# Patient Record
Sex: MALE | Race: WHITE | NOT HISPANIC OR LATINO | Employment: OTHER | URBAN - METROPOLITAN AREA
[De-identification: names, ages, dates, MRNs, and addresses within clinical notes are randomized per-mention and may not be internally consistent; named-entity substitution may affect disease eponyms.]

---

## 2018-09-17 ENCOUNTER — TELEPHONE (OUTPATIENT)
Dept: INTERNAL MEDICINE | Facility: CLINIC | Age: 48
End: 2018-09-17

## 2018-10-01 ENCOUNTER — OFFICE VISIT (OUTPATIENT)
Dept: INTERNAL MEDICINE | Facility: CLINIC | Age: 48
End: 2018-10-01
Attending: INTERNAL MEDICINE
Payer: COMMERCIAL

## 2018-10-01 VITALS
DIASTOLIC BLOOD PRESSURE: 86 MMHG | HEIGHT: 74 IN | BODY MASS INDEX: 25.21 KG/M2 | HEART RATE: 74 BPM | OXYGEN SATURATION: 95 % | WEIGHT: 196.44 LBS | SYSTOLIC BLOOD PRESSURE: 125 MMHG

## 2018-10-01 DIAGNOSIS — E78.49 OTHER HYPERLIPIDEMIA: ICD-10-CM

## 2018-10-01 DIAGNOSIS — Z00.00 ROUTINE PHYSICAL EXAMINATION: Primary | ICD-10-CM

## 2018-10-01 PROCEDURE — 99999 PR PBB SHADOW E&M-EST. PATIENT-LVL IV: CPT | Mod: PBBFAC,,, | Performed by: INTERNAL MEDICINE

## 2018-10-01 PROCEDURE — 99386 PREV VISIT NEW AGE 40-64: CPT | Mod: S$GLB,,, | Performed by: INTERNAL MEDICINE

## 2018-10-01 NOTE — PROGRESS NOTES
Subjective:       Patient ID: Darrel Puri is a 48 y.o. male.    Chief Complaint: Annual Exam    HPI:  Patient comes for annual exam.  He is not for 4 years.  He says he has been healthy.  He has gained a little weight, about 1-2 lb per year over the last 4-5 years but he still remains fairly thin.  His blood pressure was borderline but I retested it and it was slightly lower.  We spent quite a bit of time talking about cholesterol, salt and caffeine and monitoring blood pressure.  We reviewed and updated family history.  He denies any other active or acute complaints.       Review of Systems   Constitutional: Negative for activity change, chills, fatigue, fever and unexpected weight change.   HENT: Negative for ear pain, hearing loss, rhinorrhea, sore throat and trouble swallowing.    Eyes: Negative for pain, discharge and visual disturbance.   Respiratory: Negative for cough, chest tightness, shortness of breath and wheezing.    Cardiovascular: Negative for chest pain, palpitations and leg swelling.   Gastrointestinal: Negative for abdominal pain, blood in stool, constipation, diarrhea, nausea and vomiting.   Endocrine: Negative for polydipsia and polyuria.   Genitourinary: Negative for difficulty urinating, frequency, hematuria and urgency.   Musculoskeletal: Negative for arthralgias, back pain, joint swelling, myalgias, neck pain and neck stiffness.   Skin: Negative for rash and wound.   Neurological: Negative for dizziness, weakness, numbness and headaches.   Hematological: Negative for adenopathy.   Psychiatric/Behavioral: Negative for confusion and dysphoric mood. The patient is not nervous/anxious.        Objective:      Physical Exam   Constitutional: He is oriented to person, place, and time. He appears well-developed and well-nourished. No distress.   HENT:   Head: Normocephalic and atraumatic.   Right Ear: External ear normal.   Left Ear: External ear normal.   Mouth/Throat: Oropharynx is clear and moist.  No oropharyngeal exudate.   TM's clear, pharynx clear   Eyes: Conjunctivae and EOM are normal. Pupils are equal, round, and reactive to light. No scleral icterus.   Neck: Normal range of motion. Neck supple. No thyromegaly present.   No supraclavicular nodes palpated   Cardiovascular: Normal rate, regular rhythm and normal heart sounds.   No murmur heard.  Pulmonary/Chest: Effort normal and breath sounds normal. He has no wheezes.   Abdominal: Soft. Bowel sounds are normal. He exhibits no mass. There is no tenderness.   Musculoskeletal: He exhibits no edema.   Lymphadenopathy:     He has no cervical adenopathy.   Neurological: He is alert and oriented to person, place, and time.   Skin: No rash noted. No erythema. No pallor.   Several small round symmetric brown appearing moles on the back and torso.  None bleeding or asymmetric   Psychiatric: He has a normal mood and affect. His behavior is normal.       Assessment:       1. Routine physical examination    2. Other hyperlipidemia        Plan:       Darrel was seen today for annual exam.    Diagnoses and all orders for this visit:    Routine physical examination  -     CBC auto differential; Future  -     Comprehensive metabolic panel; Future  -     Lipid panel; Future    Other hyperlipidemia  -     CBC auto differential; Future  -     Comprehensive metabolic panel; Future  -     Lipid panel; Future

## 2018-10-01 NOTE — PATIENT INSTRUCTIONS
Low-Salt Diet  This diet removes foods that are high in salt. It also limits the amount of salt you use when cooking. It is most often used for people with high blood pressure, edema (fluid retention), and kidney, liver, or heart disease.  Table salt contains the mineral sodium. Your body needs sodium to work normally. But too much sodium can make your health problems worse. Your healthcare provider is recommending a low-salt (also called low-sodium) diet for you. Your total daily allowance of salt is 1,500 to 2,300 milligrams (mg). It is less than 1 teaspoon of table salt. This means you can have only about 500 to 700 mg of sodium at each meal. People with certain health problems should limit salt intake to the lower end of the recommended range.    When you cook, dont add much salt. If you can cook without using salt, even better. Dont add salt to your food at the table.  When shopping, read food labels. Salt is often called sodium on the label. Choose foods that are salt-free, low salt, or very low salt. Note that foods with reduced salt may not lower your salt intake enough.    Beans, potatoes, and pasta  Ok: Dry beans, split peas, lentils, potatoes, rice, macaroni, pasta, spaghetti without added salt  Avoid: Potato chips, tortilla chips, and similar products  Breads and cereals  Ok: Low-sodium breads, rolls, cereals, and cakes; low-salt crackers, matzo crackers  Avoid: Salted crackers, pretzels, popcorn, Welsh toast, pancakes, muffins  Dairy  Ok: Milk, chocolate milk, hot chocolate mix, low-salt cheeses, and yogurt  Avoid: Processed cheese and cheese spreads; Roquefort, Camembert, and cottage cheese; buttermilk, instant breakfast drink  Desserts  Ok: Ice cream, frozen yogurt, juice bars, gelatin, cookies and pies, sugar, honey, jelly, hard candy  Avoid: Most pies, cakes and cookies prepared or processed with salt; instant pudding  Drinks  Ok: Tea, coffee, fizzy (carbonated) drinks, juices  Avoid: Flavored  coffees, electrolyte replacement drinks, sports drinks  Meats  Ok: All fresh meat, fish, poultry, low-salt tuna, eggs, egg substitute  Avoid: Smoked, pickled, brine-cured, or salted meats and fish. This includes reeves, chipped beef, corned beef, hot dogs, deli meats, ham, kosher meats, salt pork, sausage, canned tuna, salted codfish, smoked salmon, herring, sardines, or anchovies.  Seasonings and spices  Ok: Most seasonings are okay. Good substitutes for salt include: fresh herb blends, hot sauce, lemon, garlic, funk, vinegar, dry mustard, parsley, cilantro, horseradish, tomato paste, regular margarine, mayonnaise, unsalted butter, cream cheese, vegetable oil, cream, low-salt salad dressing and gravy.  Avoid: Regular ketchup, relishes, pickles, soy sauce, teriyaki sauce, Worcestershire sauce, BBQ sauce, tartar sauce, meat tenderizer, chili sauce, regular gravy, regular salad dressing, salted butter  Soups  Ok: Low-salt soups and broths made with allowed foods  Avoid: Bouillon cubes, soups with smoked or salted meats, regular soup and broth  Vegetables  Ok: Most vegetables are okay; also low-salt tomato and vegetable juices  Avoid: Sauerkraut and other brine-soaked vegetables; pickles and other pickled vegetables; tomato juice, olives  Date Last Reviewed: 8/1/2016 © 2000-2017 [email or web address] [address] [place] [address]. All rights reserved. This information is not intended as a substitute for professional medical care. Always follow your healthcare professional's instructions.        Tips for Using Less Salt    Most people with heart problems need to eat less salt (sodium). Reducing the amount of salt you eat may help control your blood pressure. The higher your blood pressure, the greater your risk for heart disease, stroke, blindness, and kidney problems.  At the store  · Make low-salt choices by reading labels carefully. Look for the total amount of sodium per serving.  · Use more fresh  food. Buy more fruits and vegetables. Select lean meats, fish, and poultry.  · Use fewer frozen, canned, and packaged foods which often contain a lot of sodium.  · Use plain frozen vegetables without sauces or toppings. These products are often low- or no-sodium.  · Opt for reduced-sodium or no-salt-added versions of canned vegetables and soups.  In the kitchen  · Don't add salt to food when you're cooking. Season with flavorings such as onion, garlic, pepper, salt-free herbal blends, and lemon or lime juice.  · Use a cookbook containing low-salt recipes. It can give you ideas for tasty meals that are healthy for your heart.  · Sprinkle salt-free herbal blends on vegetables and meat.  · Drain and rinse canned foods, such as canned beans and vegetables, before cooking or eating.  Eating out  · Tell the  you're on a low-salt diet. Ask questions about the menu.  · Order fish, chicken, and meat broiled, baked, poached, or grilled without salt, butter, or breading.  · Use lemon, pepper, and salt-free herb mixes to add flavor.  · Choose plain steamed rice, boiled noodles, and baked or boiled potatoes. Top potatoes with chives and a little sour cream.     Beware! Salt goes by many other names. Limit foods with these words listed as ingredients: salt, sodium, soy sauce, baking soda, baking powder, MSG, monosodium, Na (the chemical symbol for sodium). Some antacids are also high in salt.   Date Last Reviewed: 6/19/2015  © 4870-9742 [employer]. [address] [place] [address], [place] [address]. All rights reserved. This information is not intended as a substitute for professional medical care. Always follow your healthcare professional's instructions.        Metabolic Syndrome: Lowering Your Blood Pressure    Metabolic syndrome is a set of five health factors that can lead to serious health problems. The factors greatly increase your risk for diabetes, heart attack, or stroke. High blood pressure (hypertension)  is one of the factors of metabolic syndrome.  What is high blood pressure?  High blood pressure is when the force of blood against the inside of your blood vessels is higher than it should be. This makes your heart work harder. And it may damage your blood vessels. High blood pressure means a level of 130 mmHg/85 mm Hg or more.  Your healthcare provider will usually check your blood pressure each time you have an office visit. Having a high reading at one office visit does not mean that you have high blood pressure. High blood pressure means that your blood pressure is high over a period of time. That is why your healthcare provider checks it at each office visit. He or she can then look at the pattern of your blood pressure. Try to arrive early enough to your appointment so you can rest before your blood pressure is taken. Avoid caffeine and smoking before your pressure is measured.   Making lifestyle changes  Lifestyle changes can help lower your blood pressure. These changes can include:  · Losing weight. Even a small amount of weight loss can lower your blood pressure. As you slowly lose weight, you may see your blood pressure gradually get lower.  · Quitting smoking. The nicotine in tobacco raises blood pressure. Smoking also increases the risk for other heart and blood vessel diseases, many cancers, and most lung conditions. The first step is to set a quit date. Talk with your healthcare provider about ways to quit smoking. There are programs and medicines that can help.  · Eating healthy. Eat plenty of fruits and vegetables. Eat fat-free or low-fat dairy foods, and drink less alcohol.  · Eating less salt (sodium). Salt can raise blood pressure. Try salt-free seasoning, or herbs and spices. Choose low-salt or no-salt snacks, deli meats, and canned foods.  · Being more physically active. Physical activity can help lower blood pressure. Get at least 30 minutes of activity on most days. If you are not very  active, talk with your healthcare provider before you get started. He or she can help you figure out what is best for you.  · Managing stress. Stress can raise blood pressure. Talk with your healthcare provider about lowering your stress level. He or she may advise relaxation methods, a counselor, health , or class.  Taking medicine  Your healthcare provider may also prescribe medicine to help lower your blood pressure. The medicine will help lessen the strain on your heart and help to protect your blood vessels. If you lose weight, you may be able to take less medicine. Or you may no longer need to take it.  Date Last Reviewed: 7/1/2016  © 4652-0844 [employer]. [address] [place] [address], [place] [address]. All rights reserved. This information is not intended as a substitute for professional medical care. Always follow your healthcare professional's instructions.

## 2018-10-02 ENCOUNTER — PATIENT MESSAGE (OUTPATIENT)
Dept: INTERNAL MEDICINE | Facility: CLINIC | Age: 48
End: 2018-10-02

## 2020-10-05 ENCOUNTER — PATIENT MESSAGE (OUTPATIENT)
Dept: ADMINISTRATIVE | Facility: HOSPITAL | Age: 50
End: 2020-10-05

## 2020-10-13 ENCOUNTER — LAB VISIT (OUTPATIENT)
Dept: LAB | Facility: HOSPITAL | Age: 50
End: 2020-10-13
Attending: INTERNAL MEDICINE
Payer: COMMERCIAL

## 2020-10-13 ENCOUNTER — OFFICE VISIT (OUTPATIENT)
Dept: OTOLARYNGOLOGY | Facility: CLINIC | Age: 50
End: 2020-10-13
Payer: COMMERCIAL

## 2020-10-13 ENCOUNTER — OFFICE VISIT (OUTPATIENT)
Dept: INTERNAL MEDICINE | Facility: CLINIC | Age: 50
End: 2020-10-13
Payer: COMMERCIAL

## 2020-10-13 VITALS
BODY MASS INDEX: 24.34 KG/M2 | DIASTOLIC BLOOD PRESSURE: 85 MMHG | WEIGHT: 189.63 LBS | HEART RATE: 55 BPM | SYSTOLIC BLOOD PRESSURE: 140 MMHG

## 2020-10-13 VITALS
SYSTOLIC BLOOD PRESSURE: 128 MMHG | HEART RATE: 64 BPM | BODY MASS INDEX: 24.3 KG/M2 | OXYGEN SATURATION: 99 % | DIASTOLIC BLOOD PRESSURE: 78 MMHG | WEIGHT: 189.38 LBS | HEIGHT: 74 IN

## 2020-10-13 DIAGNOSIS — R07.0 THROAT PAIN: ICD-10-CM

## 2020-10-13 DIAGNOSIS — J38.3 TRUE VOCAL CORD GRANULOMA: Primary | ICD-10-CM

## 2020-10-13 DIAGNOSIS — Z11.59 NEED FOR HEPATITIS C SCREENING TEST: ICD-10-CM

## 2020-10-13 DIAGNOSIS — Z11.4 SCREENING FOR HIV (HUMAN IMMUNODEFICIENCY VIRUS): ICD-10-CM

## 2020-10-13 DIAGNOSIS — E78.49 OTHER HYPERLIPIDEMIA: ICD-10-CM

## 2020-10-13 DIAGNOSIS — Z03.818 ENCOUNTER FOR OBSERVATION FOR SUSPECTED EXPOSURE TO OTHER BIOLOGICAL AGENTS RULED OUT: ICD-10-CM

## 2020-10-13 DIAGNOSIS — Z12.5 SCREENING FOR PROSTATE CANCER: ICD-10-CM

## 2020-10-13 DIAGNOSIS — K92.1 BLOOD IN STOOL: ICD-10-CM

## 2020-10-13 DIAGNOSIS — Z00.00 ROUTINE PHYSICAL EXAMINATION: ICD-10-CM

## 2020-10-13 DIAGNOSIS — Z00.00 ROUTINE PHYSICAL EXAMINATION: Primary | ICD-10-CM

## 2020-10-13 DIAGNOSIS — Z12.11 COLON CANCER SCREENING: ICD-10-CM

## 2020-10-13 LAB
ALBUMIN SERPL BCP-MCNC: 4.3 G/DL (ref 3.5–5.2)
ALP SERPL-CCNC: 73 U/L (ref 55–135)
ALT SERPL W/O P-5'-P-CCNC: 18 U/L (ref 10–44)
ANION GAP SERPL CALC-SCNC: 6 MMOL/L (ref 8–16)
AST SERPL-CCNC: 25 U/L (ref 10–40)
BASOPHILS # BLD AUTO: 0.02 K/UL (ref 0–0.2)
BASOPHILS NFR BLD: 0.8 % (ref 0–1.9)
BILIRUB SERPL-MCNC: 0.4 MG/DL (ref 0.1–1)
BUN SERPL-MCNC: 8 MG/DL (ref 6–20)
CALCIUM SERPL-MCNC: 9.5 MG/DL (ref 8.7–10.5)
CHLORIDE SERPL-SCNC: 108 MMOL/L (ref 95–110)
CHOLEST SERPL-MCNC: 209 MG/DL (ref 120–199)
CHOLEST/HDLC SERPL: 5.4 {RATIO} (ref 2–5)
CO2 SERPL-SCNC: 28 MMOL/L (ref 23–29)
COMPLEXED PSA SERPL-MCNC: 0.75 NG/ML (ref 0–4)
CREAT SERPL-MCNC: 1 MG/DL (ref 0.5–1.4)
DIFFERENTIAL METHOD: ABNORMAL
EOSINOPHIL # BLD AUTO: 0.1 K/UL (ref 0–0.5)
EOSINOPHIL NFR BLD: 2 % (ref 0–8)
ERYTHROCYTE [DISTWIDTH] IN BLOOD BY AUTOMATED COUNT: 12.2 % (ref 11.5–14.5)
EST. GFR  (AFRICAN AMERICAN): >60 ML/MIN/1.73 M^2
EST. GFR  (NON AFRICAN AMERICAN): >60 ML/MIN/1.73 M^2
GLUCOSE SERPL-MCNC: 85 MG/DL (ref 70–110)
HCT VFR BLD AUTO: 42.9 % (ref 40–54)
HDLC SERPL-MCNC: 39 MG/DL (ref 40–75)
HDLC SERPL: 18.7 % (ref 20–50)
HGB BLD-MCNC: 14.1 G/DL (ref 14–18)
IMM GRANULOCYTES # BLD AUTO: 0.01 K/UL (ref 0–0.04)
IMM GRANULOCYTES NFR BLD AUTO: 0.4 % (ref 0–0.5)
LDLC SERPL CALC-MCNC: 100.4 MG/DL (ref 63–159)
LYMPHOCYTES # BLD AUTO: 0.6 K/UL (ref 1–4.8)
LYMPHOCYTES NFR BLD: 22.7 % (ref 18–48)
MCH RBC QN AUTO: 32.6 PG (ref 27–31)
MCHC RBC AUTO-ENTMCNC: 32.9 G/DL (ref 32–36)
MCV RBC AUTO: 99 FL (ref 82–98)
MONOCYTES # BLD AUTO: 0.2 K/UL (ref 0.3–1)
MONOCYTES NFR BLD: 8.9 % (ref 4–15)
NEUTROPHILS # BLD AUTO: 1.6 K/UL (ref 1.8–7.7)
NEUTROPHILS NFR BLD: 65.2 % (ref 38–73)
NONHDLC SERPL-MCNC: 170 MG/DL
NRBC BLD-RTO: 0 /100 WBC
PLATELET # BLD AUTO: 218 K/UL (ref 150–350)
PMV BLD AUTO: 10.5 FL (ref 9.2–12.9)
POTASSIUM SERPL-SCNC: 4.9 MMOL/L (ref 3.5–5.1)
PROT SERPL-MCNC: 7.4 G/DL (ref 6–8.4)
RBC # BLD AUTO: 4.33 M/UL (ref 4.6–6.2)
SODIUM SERPL-SCNC: 142 MMOL/L (ref 136–145)
TRIGL SERPL-MCNC: 348 MG/DL (ref 30–150)
WBC # BLD AUTO: 2.47 K/UL (ref 3.9–12.7)

## 2020-10-13 PROCEDURE — 85025 COMPLETE CBC W/AUTO DIFF WBC: CPT

## 2020-10-13 PROCEDURE — 80053 COMPREHEN METABOLIC PANEL: CPT

## 2020-10-13 PROCEDURE — 99244 PR OFFICE CONSULTATION,LEVEL IV: ICD-10-PCS | Mod: 25,S$GLB,, | Performed by: OTOLARYNGOLOGY

## 2020-10-13 PROCEDURE — 99396 PR PREVENTIVE VISIT,EST,40-64: ICD-10-PCS | Mod: S$GLB,,, | Performed by: INTERNAL MEDICINE

## 2020-10-13 PROCEDURE — 99999 PR PBB SHADOW E&M-EST. PATIENT-LVL IV: CPT | Mod: PBBFAC,,, | Performed by: INTERNAL MEDICINE

## 2020-10-13 PROCEDURE — 99244 OFF/OP CNSLTJ NEW/EST MOD 40: CPT | Mod: 25,S$GLB,, | Performed by: OTOLARYNGOLOGY

## 2020-10-13 PROCEDURE — 3008F PR BODY MASS INDEX (BMI) DOCUMENTED: ICD-10-PCS | Mod: CPTII,S$GLB,, | Performed by: INTERNAL MEDICINE

## 2020-10-13 PROCEDURE — 80061 LIPID PANEL: CPT

## 2020-10-13 PROCEDURE — 84153 ASSAY OF PSA TOTAL: CPT

## 2020-10-13 PROCEDURE — 36415 COLL VENOUS BLD VENIPUNCTURE: CPT

## 2020-10-13 PROCEDURE — 99999 PR PBB SHADOW E&M-EST. PATIENT-LVL IV: ICD-10-PCS | Mod: PBBFAC,,, | Performed by: INTERNAL MEDICINE

## 2020-10-13 PROCEDURE — 99396 PREV VISIT EST AGE 40-64: CPT | Mod: S$GLB,,, | Performed by: INTERNAL MEDICINE

## 2020-10-13 PROCEDURE — 31575 DIAGNOSTIC LARYNGOSCOPY: CPT | Mod: S$GLB,,, | Performed by: OTOLARYNGOLOGY

## 2020-10-13 PROCEDURE — 99999 PR PBB SHADOW E&M-EST. PATIENT-LVL III: ICD-10-PCS | Mod: PBBFAC,,, | Performed by: OTOLARYNGOLOGY

## 2020-10-13 PROCEDURE — 99999 PR PBB SHADOW E&M-EST. PATIENT-LVL III: CPT | Mod: PBBFAC,,, | Performed by: OTOLARYNGOLOGY

## 2020-10-13 PROCEDURE — 86703 HIV-1/HIV-2 1 RESULT ANTBDY: CPT

## 2020-10-13 PROCEDURE — 3008F BODY MASS INDEX DOCD: CPT | Mod: CPTII,S$GLB,, | Performed by: INTERNAL MEDICINE

## 2020-10-13 PROCEDURE — 86803 HEPATITIS C AB TEST: CPT

## 2020-10-13 PROCEDURE — 31575 PR LARYNGOSCOPY, FLEXIBLE; DIAGNOSTIC: ICD-10-PCS | Mod: S$GLB,,, | Performed by: OTOLARYNGOLOGY

## 2020-10-13 RX ORDER — PANTOPRAZOLE SODIUM 40 MG/1
40 TABLET, DELAYED RELEASE ORAL DAILY
Qty: 30 TABLET | Refills: 11 | Status: SHIPPED | OUTPATIENT
Start: 2020-10-13 | End: 2023-04-18

## 2020-10-13 NOTE — LETTER
October 13, 2020      Eris Higgins MD  [address]  [facility] [address]           [id number]  [address]  [facility] [address]  Phone: [phone]  Fax: [phone]          Patient: Darrel Puri   MR Number: [id number]   YOB: 1970   Date of Visit: 10/13/2020       Dear Dr. Eris Higgins:    Thank you for referring Darrel Puri to me for evaluation. Attached you will find relevant portions of my assessment and plan of care.    If you have questions, please do not hesitate to call me. I look forward to following Darrel Puri along with you.    Sincerely,    Chidi Flores MD    Enclosure  CC:  No Recipients    If you would like to receive this communication electronically, please contact [email or web address] or [phone] to request more information on [employer] access.    For providers and/or their staff who would like to refer a patient to [facility], please contact us through our one-stop-shop provider referral line, [facility], [place], at [phone].    If you feel you have received this communication in error or would no longer like to receive these types of communications, please e-mail [email or web address]

## 2020-10-13 NOTE — PROGRESS NOTES
Subjective:       Patient ID: Darrel Puri is a 50 y.o. male.    Chief Complaint: Annual Exam and Oral Swelling    Patient here for annual exam.  It has been about 2 years since he has been in.  He did complain of some sensations of swelling or soreness to the left side of the throat.  He feels it with swallowing but sometimes even without swallowing.  He denies any coughing or spitting up blood and has never smoked.    He also mentions a couple of episodes of blood in the stool or when white being.  When it occurs it is not necessarily with straining.  It may only be for a day then it goes away.  He denies any abdominal pain.  He has not tried any symptomatic treatment.    Review of Systems   Constitutional: Negative for activity change and unexpected weight change.   HENT: Positive for sore throat (Sore throat/swelling on the left side). Negative for hearing loss, rhinorrhea and trouble swallowing.    Eyes: Negative for discharge and visual disturbance.   Respiratory: Negative for chest tightness and wheezing.    Cardiovascular: Negative for chest pain and palpitations.   Gastrointestinal: Positive for blood in stool. Negative for constipation, diarrhea and vomiting.   Endocrine: Negative for polydipsia and polyuria.   Genitourinary: Negative for difficulty urinating, hematuria and urgency.   Musculoskeletal: Negative for arthralgias, joint swelling and neck pain.   Neurological: Negative for weakness and headaches.   Psychiatric/Behavioral: Negative for confusion and dysphoric mood.         Objective:      Physical Exam  Constitutional:       General: He is not in acute distress.     Appearance: He is well-developed.   HENT:      Head: Normocephalic and atraumatic.      Right Ear: Tympanic membrane, ear canal and external ear normal.      Left Ear: Tympanic membrane, ear canal and external ear normal.      Nose: Nose normal. No rhinorrhea.      Mouth/Throat:      Pharynx: No oropharyngeal exudate or posterior  oropharyngeal erythema.   Eyes:      Conjunctiva/sclera: Conjunctivae normal.      Pupils: Pupils are equal, round, and reactive to light.   Neck:      Musculoskeletal: Normal range of motion and neck supple. No muscular tenderness (No palpable tenderness).      Thyroid: No thyromegaly.      Vascular: No JVD.      Comments: No supraclavicular nodes palpated bilaterally.   Cardiovascular:      Rate and Rhythm: Normal rate and regular rhythm.      Heart sounds: Normal heart sounds. No murmur.   Pulmonary:      Effort: Pulmonary effort is normal.      Breath sounds: Normal breath sounds. No wheezing or rales.   Abdominal:      General: Bowel sounds are normal. There is no distension.      Palpations: Abdomen is soft. There is no mass.      Tenderness: There is no abdominal tenderness.   Genitourinary:     Prostate: Normal. Not enlarged and not tender.      Rectum: Normal. Guaiac result negative. No tenderness.      Comments: Small flat external hemorrhoid, no evidence of recent bleeding  Musculoskeletal: Normal range of motion.         General: No tenderness.   Lymphadenopathy:      Cervical: No cervical adenopathy.      Upper Body:      Right upper body: No supraclavicular adenopathy.      Left upper body: No supraclavicular adenopathy.   Skin:     General: Skin is warm and dry.      Findings: No rash.   Neurological:      General: No focal deficit present.      Mental Status: He is alert and oriented to person, place, and time.      Cranial Nerves: No cranial nerve deficit.      Deep Tendon Reflexes: Reflexes are normal and symmetric.      Reflex Scores:       Bicep reflexes are 2+ on the right side and 2+ on the left side.       Patellar reflexes are 2+ on the right side and 2+ on the left side.  Psychiatric:         Mood and Affect: Mood normal. Mood is not depressed.         Behavior: Behavior normal.         Thought Content: Thought content normal.         Assessment:       1. Routine physical examination    2.  Throat pain    3. Other hyperlipidemia    4. Blood in stool        Plan:       Darrel was seen today for annual exam and oral swelling.    Diagnoses and all orders for this visit:    Routine physical examination  -     Lipid Panel; Future  -     PSA, Screening; Future  -     CBC auto differential; Future  -     Comprehensive Metabolic Panel; Future    Throat pain  Comments:  Left side, feels it with swallowing or clearing throat. present for months.   Orders:  -     Ambulatory referral/consult to ENT; Future    Other hyperlipidemia  -     Lipid Panel; Future  -     Comprehensive Metabolic Panel; Future    Blood in stool  Comments:  About 4-5 episodes. Blood in commode, outside stool or with wiping. No known contributing factors.   Orders:  -     CBC auto differential; Future  -     Case request GI: COLONOSCOPY    Screening for prostate cancer  -     PSA, Screening; Future    Colon cancer screening  -     Case request GI: COLONOSCOPY    Need for hepatitis C screening test  -     Hepatitis C Antibody; Future    Screening for HIV (human immunodeficiency virus)  -     HIV 1/2 Ag/Ab (4th Gen); Future

## 2020-10-13 NOTE — PROGRESS NOTES
Head and Neck Surgery Consult    Seen in consultation from Dr Higgins    HPI: Darrel Puri is a 50 y.o. male presenting with 1 year of globus on the L side. This has not been treated with anything. It is intermittent but frequent, denies aspiration, dysphagia, though reports occasional odynophagia. He does report frequent throat-clearing, and uses his voice at work, [id number] he is not working much now.    Past Medical History:   Diagnosis Date    Allergy     Other and unspecified hyperlipidemia 10/18/2013       History reviewed. No pertinent surgical history.    No current outpatient medications on file.    Review of patient's allergies indicates:   Allergen Reactions    Amoxicillin     Penicillins        Family History   Problem Relation Age of Onset    Heart disease Father     Diabetes Father     Hyperlipidemia Father     Heart disease Maternal Grandfather     Skin cancer Maternal Grandfather     Hypertension Brother        Social History     Socioeconomic History    Marital status:      Spouse name: Not on file    Number of children: Not on file    Years of education: Not on file    Highest education level: Not on file   Occupational History    Occupation:     Social Needs    Financial resource strain: Not very hard    Food insecurity     Worry: Never true     Inability: Never true    Transportation needs     Medical: No     Non-medical: No   Tobacco Use    Smoking status: Never Smoker   Substance and Sexual Activity    Alcohol use: Yes     Frequency: 2-3 times a week     Drinks per session: 1 or 2     Binge frequency: Less than monthly    Drug use: No    Sexual activity: Yes     Partners: Female   Lifestyle    Physical activity     Days per week: 4 days     Minutes per session: 20 min    Stress: Only a little   Relationships    Social connections     Talks on phone: Once a week     Gets together: Twice a week     Attends [employer] service: Not on file     Active member of  club or organization: No     Attends meetings of clubs or organizations: Never     Relationship status:    Other Topics Concern    Not on file   Social History Narrative    Not on file       Review of Systems -  Constitutional: Denies having night sweats, constant fatigue, loss of appetite or recent substantial weight loss.  Eyes: Denies blurred vision or double vision.  Respiratory: Denies symptoms of shortness of breath, noisy breathing, hoarseness or chronic cough.  GI: Denies symptoms of heartburn, acid regurgitation, or the known presence of a hiatal hernia.  The remainder of a 10-point review of systems is negative    REVIEW OF RADIOLOGICAL FILMS AND RECORDS (PERSONALLY REVIEWED):  Noncontributory    REVIEW OF LABS (PERSONALLY REVIEWED)  Noncontributory    PHYSICAL EXAM:  Vitals - There were no vitals taken for this visit.  Constitutional -      General Appearance: well developed, well nourished, without obvious deformities     Communication: speaks with a normal voice without hoarseness  Head & Face -     Overall: no obvious scars, lesions or masses     Parotid and submandibular glands: no masses or tenderness     Facial strength: normal and equal bilaterally  Eyes -      EOM intact  Ear, Nose, Mouth & Throat -     Ears: both left and right external auditory canals and TM's are normal, no external deformities     Nasal exam: mucosa is pink, septum is midline, visible turbinates are normal on anterior rhinoscopy     Mastication: teeth appear in good repair     Oral Cavity and oropharynx: mucosa, hard and soft palates, tongue, posterior pharyngeal wall, lips and gums are without lesions. Tonsils appear 1+  Respiratory:     Breathing unlabored, no stridor  Cardiovascular:     No JVD, capillary refill normal  Larynx: using the mirror for indirect laryngoscopy, the epiglottic, false cords, true cords, and pyriform sinuses are without lesions and the true vocal cords move normally  Neck: appears symmetric,  and on palpation is without masses   Endocrine:     Thyroid: no asymmetry, thyromegaly, or thyroid nodules on palpation  Lymphatic:     No cervical lymphadenopathy  Cranial Nerves:      II: Pupillary reflexes normal     III, IV, VI: EOM normal     V: 1,2,3: normal sensation     VII: Normal strength in all divisions     IX, X: Normal voice, palatal elevation and sensation     XI: Shoulder strength normal       XII: Tongue mobility normal  Psychiatric:     Appropriate affect    FLEXIBLE LARYNGOSCOPY     Indications:  globus     Procedure:  With the patient sitting upright, informed consent was obtained.  Topical anesthesia and vasoconstriction was applied to both nares.  After waiting an appropriate period of time for anesthesia/vasoconstriction to become effective, a flexible laryngoscope was passed through the R side(s) of the nose and the nose, nasopharynx, oropharynx, hypopharynx and larynx were examined.  The patient tolerated the procedure without complications.     Findings: The nasal passageways and nasopharynx appear normal without edema or erythema.  The oropharynx, base of tongue, piriform sinuses, epiglottis, and aryepiglottic folds were examined and no masses, lesions, or ulcerations were seen except for a L posterior TVC granuloma.  The true vocal cords move well to midline bilaterally.  The airway is widely patent.      ASSESSMENT: TVC granuloma    PLAN: We discussed the etiology and management. Will begin with hydration, voice rest, and PPI and reassess in 1 month. If persists at that time will start steroid inhaler and consider referral to [facility] for laser ablation.       Chidi Flores

## 2020-10-14 LAB
HCV AB SERPL QL IA: NEGATIVE
HIV 1+2 AB+HIV1 P24 AG SERPL QL IA: NEGATIVE

## 2020-10-15 ENCOUNTER — PATIENT MESSAGE (OUTPATIENT)
Dept: INTERNAL MEDICINE | Facility: CLINIC | Age: 50
End: 2020-10-15

## 2020-11-11 ENCOUNTER — LAB VISIT (OUTPATIENT)
Dept: SURGERY | Facility: CLINIC | Age: 50
End: 2020-11-11
Payer: COMMERCIAL

## 2020-11-11 DIAGNOSIS — Z03.818 ENCOUNTER FOR OBSERVATION FOR SUSPECTED EXPOSURE TO OTHER BIOLOGICAL AGENTS RULED OUT: ICD-10-CM

## 2020-11-11 LAB — SARS-COV-2 RNA RESP QL NAA+PROBE: NOT DETECTED

## 2020-11-11 PROCEDURE — U0003 INFECTIOUS AGENT DETECTION BY NUCLEIC ACID (DNA OR RNA); SEVERE ACUTE RESPIRATORY SYNDROME CORONAVIRUS 2 (SARS-COV-2) (CORONAVIRUS DISEASE [COVID-19]), AMPLIFIED PROBE TECHNIQUE, MAKING USE OF HIGH THROUGHPUT TECHNOLOGIES AS DESCRIBED BY CMS-2020-01-R: HCPCS

## 2020-11-13 ENCOUNTER — OFFICE VISIT (OUTPATIENT)
Dept: OTOLARYNGOLOGY | Facility: CLINIC | Age: 50
End: 2020-11-13
Payer: COMMERCIAL

## 2020-11-13 VITALS
DIASTOLIC BLOOD PRESSURE: 73 MMHG | HEART RATE: 52 BPM | WEIGHT: 186.06 LBS | BODY MASS INDEX: 23.89 KG/M2 | SYSTOLIC BLOOD PRESSURE: 128 MMHG

## 2020-11-13 DIAGNOSIS — J38.3 TRUE VOCAL CORD GRANULOMA: Primary | ICD-10-CM

## 2020-11-13 PROCEDURE — 99213 OFFICE O/P EST LOW 20 MIN: CPT | Mod: 25,S$GLB,, | Performed by: OTOLARYNGOLOGY

## 2020-11-13 PROCEDURE — 31575 PR LARYNGOSCOPY, FLEXIBLE; DIAGNOSTIC: ICD-10-PCS | Mod: S$GLB,,, | Performed by: OTOLARYNGOLOGY

## 2020-11-13 PROCEDURE — 3008F BODY MASS INDEX DOCD: CPT | Mod: CPTII,S$GLB,, | Performed by: OTOLARYNGOLOGY

## 2020-11-13 PROCEDURE — 1126F PR PAIN SEVERITY QUANTIFIED, NO PAIN PRESENT: ICD-10-PCS | Mod: S$GLB,,, | Performed by: OTOLARYNGOLOGY

## 2020-11-13 PROCEDURE — 99999 PR PBB SHADOW E&M-EST. PATIENT-LVL III: CPT | Mod: PBBFAC,,, | Performed by: OTOLARYNGOLOGY

## 2020-11-13 PROCEDURE — 1126F AMNT PAIN NOTED NONE PRSNT: CPT | Mod: S$GLB,,, | Performed by: OTOLARYNGOLOGY

## 2020-11-13 PROCEDURE — 99213 PR OFFICE/OUTPT VISIT, EST, LEVL III, 20-29 MIN: ICD-10-PCS | Mod: 25,S$GLB,, | Performed by: OTOLARYNGOLOGY

## 2020-11-13 PROCEDURE — 99999 PR PBB SHADOW E&M-EST. PATIENT-LVL III: ICD-10-PCS | Mod: PBBFAC,,, | Performed by: OTOLARYNGOLOGY

## 2020-11-13 PROCEDURE — 31575 DIAGNOSTIC LARYNGOSCOPY: CPT | Mod: S$GLB,,, | Performed by: OTOLARYNGOLOGY

## 2020-11-13 PROCEDURE — 3008F PR BODY MASS INDEX (BMI) DOCUMENTED: ICD-10-PCS | Mod: CPTII,S$GLB,, | Performed by: OTOLARYNGOLOGY

## 2020-11-13 NOTE — PROGRESS NOTES
HEAD AND NECK SURGICAL ONCOLOGY CLINIC NOTE    CC: F/U TVC granuloma    INTERVAL HISTORY:Darrel Puri returns to the [facility] for follow-up of granuloma. No complaints today.Denies dysphagia, odynophagia, throat pain and otalgia.Voice is stable, and he has been resting and using confidential voice.     Exam:  Voice strong, no breaks or hoarseness  Neck nontender, no masses    FLEXIBLE LARYNGOSCOPY     Indications:  TVC granuloma     Procedure:  With the patient sitting upright, informed consent was obtained.  Topical anesthesia and vasoconstriction was applied to both nares.  After waiting an appropriate period of time for anesthesia/vasoconstriction to become effective, a flexible laryngoscope was passed through the L side(s) of the nose and the nose, nasopharynx, oropharynx, hypopharynx and larynx were examined.  The patient tolerated the procedure without complications.     Findings: The nasal passageways and nasopharynx appear normal without edema or erythema.  The oropharynx, base of tongue, piriform sinuses, epiglottis, and aryepiglottic folds were examined and no masses, lesions, or ulcerations were seen.  Granuloma has resolved. The true vocal cords move well to midline bilaterally.  The airway is widely patent.      A/P: He has recovered very well. Gradually resume normal voice use. If he finds he has to frequently project voice at work, can arrange SLP follow up for proper techniques and voice hygiene.

## 2021-04-16 ENCOUNTER — PATIENT MESSAGE (OUTPATIENT)
Dept: RESEARCH | Facility: HOSPITAL | Age: 51
End: 2021-04-16

## 2021-11-16 ENCOUNTER — IMMUNIZATION (OUTPATIENT)
Dept: INTERNAL MEDICINE | Facility: CLINIC | Age: 51
End: 2021-11-16
Payer: COMMERCIAL

## 2021-11-16 DIAGNOSIS — Z23 NEED FOR VACCINATION: Primary | ICD-10-CM

## 2021-11-16 PROCEDURE — 0004A COVID-19, MRNA, LNP-S, PF, 30 MCG/0.3 ML DOSE VACCINE: CPT | Mod: CV19,PBBFAC | Performed by: INTERNAL MEDICINE

## 2022-01-26 ENCOUNTER — PATIENT MESSAGE (OUTPATIENT)
Dept: ADMINISTRATIVE | Facility: HOSPITAL | Age: 52
End: 2022-01-26
Payer: COMMERCIAL

## 2023-04-18 ENCOUNTER — OFFICE VISIT (OUTPATIENT)
Dept: INTERNAL MEDICINE | Facility: CLINIC | Age: 53
End: 2023-04-18
Payer: COMMERCIAL

## 2023-04-18 VITALS
HEART RATE: 73 BPM | SYSTOLIC BLOOD PRESSURE: 122 MMHG | OXYGEN SATURATION: 99 % | DIASTOLIC BLOOD PRESSURE: 86 MMHG | WEIGHT: 187.81 LBS | BODY MASS INDEX: 24.1 KG/M2 | HEIGHT: 74 IN

## 2023-04-18 DIAGNOSIS — Z12.11 COLON CANCER SCREENING: ICD-10-CM

## 2023-04-18 DIAGNOSIS — K64.9 HEMORRHOIDS, UNSPECIFIED HEMORRHOID TYPE: ICD-10-CM

## 2023-04-18 DIAGNOSIS — Z00.00 ROUTINE PHYSICAL EXAMINATION: Primary | ICD-10-CM

## 2023-04-18 DIAGNOSIS — Z12.5 SCREENING FOR PROSTATE CANCER: ICD-10-CM

## 2023-04-18 DIAGNOSIS — K62.3 RECTAL PROLAPSE: ICD-10-CM

## 2023-04-18 DIAGNOSIS — R21 RASH OF FACE: ICD-10-CM

## 2023-04-18 DIAGNOSIS — E78.49 OTHER HYPERLIPIDEMIA: ICD-10-CM

## 2023-04-18 DIAGNOSIS — Z01.00 EYE EXAM, ROUTINE: ICD-10-CM

## 2023-04-18 PROCEDURE — 1159F MED LIST DOCD IN RCRD: CPT | Mod: CPTII,S$GLB,, | Performed by: INTERNAL MEDICINE

## 2023-04-18 PROCEDURE — 3074F PR MOST RECENT SYSTOLIC BLOOD PRESSURE < 130 MM HG: ICD-10-PCS | Mod: CPTII,S$GLB,, | Performed by: INTERNAL MEDICINE

## 2023-04-18 PROCEDURE — 99396 PR PREVENTIVE VISIT,EST,40-64: ICD-10-PCS | Mod: S$GLB,,, | Performed by: INTERNAL MEDICINE

## 2023-04-18 PROCEDURE — 99396 PREV VISIT EST AGE 40-64: CPT | Mod: S$GLB,,, | Performed by: INTERNAL MEDICINE

## 2023-04-18 PROCEDURE — 3074F SYST BP LT 130 MM HG: CPT | Mod: CPTII,S$GLB,, | Performed by: INTERNAL MEDICINE

## 2023-04-18 PROCEDURE — 99999 PR PBB SHADOW E&M-EST. PATIENT-LVL IV: CPT | Mod: PBBFAC,,, | Performed by: INTERNAL MEDICINE

## 2023-04-18 PROCEDURE — 3079F DIAST BP 80-89 MM HG: CPT | Mod: CPTII,S$GLB,, | Performed by: INTERNAL MEDICINE

## 2023-04-18 PROCEDURE — 3008F PR BODY MASS INDEX (BMI) DOCUMENTED: ICD-10-PCS | Mod: CPTII,S$GLB,, | Performed by: INTERNAL MEDICINE

## 2023-04-18 PROCEDURE — 99999 PR PBB SHADOW E&M-EST. PATIENT-LVL IV: ICD-10-PCS | Mod: PBBFAC,,, | Performed by: INTERNAL MEDICINE

## 2023-04-18 PROCEDURE — 3079F PR MOST RECENT DIASTOLIC BLOOD PRESSURE 80-89 MM HG: ICD-10-PCS | Mod: CPTII,S$GLB,, | Performed by: INTERNAL MEDICINE

## 2023-04-18 PROCEDURE — 3008F BODY MASS INDEX DOCD: CPT | Mod: CPTII,S$GLB,, | Performed by: INTERNAL MEDICINE

## 2023-04-18 PROCEDURE — 1159F PR MEDICATION LIST DOCUMENTED IN MEDICAL RECORD: ICD-10-PCS | Mod: CPTII,S$GLB,, | Performed by: INTERNAL MEDICINE

## 2023-04-18 NOTE — PROGRESS NOTES
Subjective:       Patient ID: Darrel Puri is a 53 y.o. male.    Chief Complaint: Annual Exam    Annual Exam.  Overall seems doing well.  He has a few issues to discuss including wanting to do labs, a rash on the upper eyelids, that is a little dry and itchy and responds to moisturizing lotion.  He also says that when he had bowel movement frequently is a new foods or prolapse since and he has to push it back in.  He does not believe it is a hemorrhoid although he has 1.  That had bled a little bit.  He is due for a colonoscopy which we try to arrange during COVID but it did not happen.     Review of Systems   Constitutional:  Negative for activity change and unexpected weight change.   HENT:  Negative for hearing loss, rhinorrhea and trouble swallowing.    Eyes:  Negative for discharge and visual disturbance.   Respiratory:  Negative for chest tightness and wheezing.    Cardiovascular:  Negative for chest pain and palpitations.   Gastrointestinal:  Negative for blood in stool, constipation, diarrhea and vomiting.        Rectal prolapse   Endocrine: Negative for polydipsia and polyuria.   Genitourinary:  Negative for difficulty urinating, hematuria and urgency.   Musculoskeletal:  Negative for arthralgias, joint swelling and neck pain.   Integumentary:  Positive for rash (upper lids).   Neurological:  Negative for weakness and headaches.   Psychiatric/Behavioral:  Negative for confusion and dysphoric mood.          Past Medical History:   Diagnosis Date    Allergy     Other and unspecified hyperlipidemia 10/18/2013     No past surgical history on file.   Patient Active Problem List   Diagnosis    Other hyperlipidemia        Objective:      Physical Exam  Constitutional:       General: He is not in acute distress.     Appearance: He is well-developed.   HENT:      Head: Normocephalic and atraumatic.      Right Ear: Tympanic membrane, ear canal and external ear normal.      Left Ear: Tympanic membrane, ear canal and  external ear normal.      Nose: Nose normal. No rhinorrhea.      Mouth/Throat:      Pharynx: No oropharyngeal exudate or posterior oropharyngeal erythema.   Eyes:      Conjunctiva/sclera: Conjunctivae normal.      Pupils: Pupils are equal, round, and reactive to light.   Neck:      Thyroid: No thyromegaly.      Vascular: No JVD.      Comments: No supraclavicular nodes palpated bilaterally.   Cardiovascular:      Rate and Rhythm: Normal rate and regular rhythm.      Pulses: Normal pulses.      Heart sounds: Normal heart sounds. No murmur heard.  Pulmonary:      Effort: Pulmonary effort is normal.      Breath sounds: Normal breath sounds. No wheezing or rales.   Abdominal:      General: Bowel sounds are normal. There is no distension.      Palpations: Abdomen is soft. There is no mass.      Tenderness: There is no abdominal tenderness.   Genitourinary:     Prostate: Normal. Not enlarged and not tender.      Rectum: Normal. Guaiac result negative. No tenderness.      Comments: Small external hemorrhoid. I had him valsalva and I did not see a prolapse but when done during rectal exam, there was tendency to move externally.   Musculoskeletal:         General: No tenderness. Normal range of motion.      Cervical back: Normal range of motion and neck supple.      Right lower leg: No edema.      Left lower leg: No edema.   Lymphadenopathy:      Cervical: No cervical adenopathy.      Upper Body:      Right upper body: No supraclavicular adenopathy.      Left upper body: No supraclavicular adenopathy.   Skin:     General: Skin is warm and dry.      Coloration: Skin is not jaundiced.      Findings: Rash (bilateral upper eyelids) present.   Neurological:      General: No focal deficit present.      Mental Status: He is alert and oriented to person, place, and time.      Cranial Nerves: No cranial nerve deficit.      Coordination: Coordination normal.      Deep Tendon Reflexes: Reflexes are normal and symmetric.      Reflex  "Scores:       Bicep reflexes are 2+ on the right side and 2+ on the left side.       Patellar reflexes are 2+ on the right side and 2+ on the left side.  Psychiatric:         Mood and Affect: Mood normal. Mood is not depressed.         Behavior: Behavior normal.         Thought Content: Thought content normal.       Assessment:       Problem List Items Addressed This Visit          Cardiac/Vascular    Other hyperlipidemia    Relevant Orders    Lipid Panel     Other Visit Diagnoses       Routine physical examination    -  Primary    Screening for prostate cancer        Relevant Orders    PSA, Screening    Colon cancer screening        Relevant Orders    Ambulatory referral/consult to Endo Procedure     Hemorrhoids, unspecified hemorrhoid type        Relevant Orders    Ambulatory referral/consult to Colorectal Surgery    Rectal prolapse        Relevant Orders    Ambulatory referral/consult to Colorectal Surgery    Eye exam, routine        Relevant Orders    TSH    Rash of face        primarily upper eyelids            Plan:         Darrel was seen today for annual exam.    Diagnoses and all orders for this visit:    Routine physical examination    Screening for prostate cancer  -     PSA, Screening; Future    Other hyperlipidemia  -     Lipid Panel; Future    Colon cancer screening  -     Ambulatory referral/consult to Endo Procedure ; Future    Hemorrhoids, unspecified hemorrhoid type  -     Ambulatory referral/consult to Colorectal Surgery; Future    Rectal prolapse  -     Ambulatory referral/consult to Colorectal Surgery; Future    Eye exam, routine  -     TSH; Future    Rash of face  Comments:  primarily upper eyelids           Continue moisturizing lotion, trial of antihistamine for 1 week.   May need further eval if rash does not respond to antihistamine and optometry visit .          Portions of this note may have been created with voice recognition software. Occasional "wrong-word" or " ""sound-a-like" substitutions may have occurred due to the inherent limitations of voice recognition software. Please, read the note carefully and recognize, using context, where substitutions have occurred.  "

## 2023-04-19 ENCOUNTER — TELEPHONE (OUTPATIENT)
Dept: ENDOSCOPY | Facility: HOSPITAL | Age: 53
End: 2023-04-19
Payer: COMMERCIAL

## 2023-04-19 ENCOUNTER — OFFICE VISIT (OUTPATIENT)
Dept: SURGERY | Facility: CLINIC | Age: 53
End: 2023-04-19
Payer: COMMERCIAL

## 2023-04-19 ENCOUNTER — LAB VISIT (OUTPATIENT)
Dept: LAB | Facility: HOSPITAL | Age: 53
End: 2023-04-19
Attending: INTERNAL MEDICINE
Payer: COMMERCIAL

## 2023-04-19 VITALS
HEART RATE: 81 BPM | DIASTOLIC BLOOD PRESSURE: 86 MMHG | BODY MASS INDEX: 23.82 KG/M2 | WEIGHT: 185.63 LBS | SYSTOLIC BLOOD PRESSURE: 129 MMHG | HEIGHT: 74 IN

## 2023-04-19 DIAGNOSIS — Z12.5 SCREENING FOR PROSTATE CANCER: ICD-10-CM

## 2023-04-19 DIAGNOSIS — Z01.00 EYE EXAM, ROUTINE: ICD-10-CM

## 2023-04-19 DIAGNOSIS — E78.49 OTHER HYPERLIPIDEMIA: ICD-10-CM

## 2023-04-19 DIAGNOSIS — K64.9 HEMORRHOIDS, UNSPECIFIED HEMORRHOID TYPE: ICD-10-CM

## 2023-04-19 DIAGNOSIS — K62.3 RECTAL PROLAPSE: ICD-10-CM

## 2023-04-19 DIAGNOSIS — K62.5 BRBPR (BRIGHT RED BLOOD PER RECTUM): Primary | ICD-10-CM

## 2023-04-19 DIAGNOSIS — Z12.11 SPECIAL SCREENING FOR MALIGNANT NEOPLASMS, COLON: Primary | ICD-10-CM

## 2023-04-19 LAB
CHOLEST SERPL-MCNC: 205 MG/DL (ref 120–199)
CHOLEST/HDLC SERPL: 4.8 {RATIO} (ref 2–5)
COMPLEXED PSA SERPL-MCNC: 0.94 NG/ML (ref 0–4)
HDLC SERPL-MCNC: 43 MG/DL (ref 40–75)
HDLC SERPL: 21 % (ref 20–50)
LDLC SERPL CALC-MCNC: 138.8 MG/DL (ref 63–159)
NONHDLC SERPL-MCNC: 162 MG/DL
TRIGL SERPL-MCNC: 116 MG/DL (ref 30–150)
TSH SERPL DL<=0.005 MIU/L-ACNC: 1.61 UIU/ML (ref 0.4–4)

## 2023-04-19 PROCEDURE — 99204 PR OFFICE/OUTPT VISIT, NEW, LEVL IV, 45-59 MIN: ICD-10-PCS | Mod: 25,S$GLB,, | Performed by: NURSE PRACTITIONER

## 2023-04-19 PROCEDURE — 3008F PR BODY MASS INDEX (BMI) DOCUMENTED: ICD-10-PCS | Mod: CPTII,S$GLB,, | Performed by: NURSE PRACTITIONER

## 2023-04-19 PROCEDURE — 84153 ASSAY OF PSA TOTAL: CPT | Performed by: INTERNAL MEDICINE

## 2023-04-19 PROCEDURE — 3074F PR MOST RECENT SYSTOLIC BLOOD PRESSURE < 130 MM HG: ICD-10-PCS | Mod: CPTII,S$GLB,, | Performed by: NURSE PRACTITIONER

## 2023-04-19 PROCEDURE — 3079F PR MOST RECENT DIASTOLIC BLOOD PRESSURE 80-89 MM HG: ICD-10-PCS | Mod: CPTII,S$GLB,, | Performed by: NURSE PRACTITIONER

## 2023-04-19 PROCEDURE — 80061 LIPID PANEL: CPT | Performed by: INTERNAL MEDICINE

## 2023-04-19 PROCEDURE — 99204 OFFICE O/P NEW MOD 45 MIN: CPT | Mod: 25,S$GLB,, | Performed by: NURSE PRACTITIONER

## 2023-04-19 PROCEDURE — 46600 PR DIAG2STIC A2SCOPY: ICD-10-PCS | Mod: S$GLB,,, | Performed by: NURSE PRACTITIONER

## 2023-04-19 PROCEDURE — 1159F PR MEDICATION LIST DOCUMENTED IN MEDICAL RECORD: ICD-10-PCS | Mod: CPTII,S$GLB,, | Performed by: NURSE PRACTITIONER

## 2023-04-19 PROCEDURE — 84443 ASSAY THYROID STIM HORMONE: CPT | Performed by: INTERNAL MEDICINE

## 2023-04-19 PROCEDURE — 3008F BODY MASS INDEX DOCD: CPT | Mod: CPTII,S$GLB,, | Performed by: NURSE PRACTITIONER

## 2023-04-19 PROCEDURE — 46600 DIAGNOSTIC ANOSCOPY SPX: CPT | Mod: S$GLB,,, | Performed by: NURSE PRACTITIONER

## 2023-04-19 PROCEDURE — 99999 PR PBB SHADOW E&M-EST. PATIENT-LVL IV: ICD-10-PCS | Mod: PBBFAC,,, | Performed by: NURSE PRACTITIONER

## 2023-04-19 PROCEDURE — 3074F SYST BP LT 130 MM HG: CPT | Mod: CPTII,S$GLB,, | Performed by: NURSE PRACTITIONER

## 2023-04-19 PROCEDURE — 3079F DIAST BP 80-89 MM HG: CPT | Mod: CPTII,S$GLB,, | Performed by: NURSE PRACTITIONER

## 2023-04-19 PROCEDURE — 99999 PR PBB SHADOW E&M-EST. PATIENT-LVL IV: CPT | Mod: PBBFAC,,, | Performed by: NURSE PRACTITIONER

## 2023-04-19 PROCEDURE — 36415 COLL VENOUS BLD VENIPUNCTURE: CPT | Performed by: INTERNAL MEDICINE

## 2023-04-19 PROCEDURE — 1159F MED LIST DOCD IN RCRD: CPT | Mod: CPTII,S$GLB,, | Performed by: NURSE PRACTITIONER

## 2023-04-19 RX ORDER — SODIUM, POTASSIUM,MAG SULFATES 17.5-3.13G
1 SOLUTION, RECONSTITUTED, ORAL ORAL DAILY
Qty: 1 KIT | Refills: 0 | Status: SHIPPED | OUTPATIENT
Start: 2023-04-19 | End: 2023-04-21

## 2023-04-19 RX ORDER — HYDROCORTISONE 25 MG/G
CREAM TOPICAL 2 TIMES DAILY
Qty: 28 G | Refills: 1 | Status: SHIPPED | OUTPATIENT
Start: 2023-04-19

## 2023-04-19 NOTE — PROGRESS NOTES
"CRS Office Visit History and Physical    Referring Md:   Eris Higgins Md  [address] [place] [address]    SUBJECTIVE:     Chief Complaint: rectal prolapse and hemorrhoids    History of Present Illness:  The patient is new patient to this practice.   Course is as follows:  Patient is a 53 y.o. male presents with rectal prolapse and hemorrhoid  Prolapsing anus started a year ago. With every BM he has a prolapsing anus and has to push it back it.  Has a BM once a day. Variable consistency. No straining. >5 mins on toilet   Psyllium husk, usually once a day.   Drinks plenty of water with this.  Occasional BRB on TP with wiping.    Denies proctalgia and itching.   Denies being dx with auto immune disorders or connective tissue diseases     Last Colonoscopy: no  Family history of colorectal cancer or IBD: no.    Review of patient's allergies indicates:   Allergen Reactions    Amoxicillin     Penicillins        Past Medical History:   Diagnosis Date    Allergy     Other and unspecified hyperlipidemia 10/18/2013     History reviewed. No pertinent surgical history.  Family History   Problem Relation Age of Onset    Stroke Father     Heart disease Father     Diabetes Father     Hyperlipidemia Father     Hypertension Brother     Heart disease Maternal Grandfather     Skin cancer Maternal Grandfather      Social History     Tobacco Use    Smoking status: Never   Substance Use Topics    Alcohol use: Yes    Drug use: No        Review of Systems:  Review of Systems   Constitutional:  Negative for chills, fever and weight loss.   Gastrointestinal:  Negative for abdominal pain, nausea and vomiting.     OBJECTIVE:     Vital Signs (Most Recent)  /86 (BP Location: Left arm, Patient Position: Sitting, BP Method: Large (Automatic))   Pulse 81   Ht 6' 2" (1.88 m)   Wt 84.2 kg (185 lb 9.6 oz)   BMI 23.83 kg/m²     Physical Exam:  General: White male in no distress   Neuro: Alert and oriented to person, place, " and time.  Moves all extremities.     HEENT: No icterus.  Trachea midline  Respiratory: Respirations are even and unlabored, no cough or audible wheezing  Skin: Warm dry and intact, No visible rashes, no jaundice    Labs reviewed today:  Lab Results   Component Value Date    WBC 2.47 (L) 10/13/2020    HGB 14.1 10/13/2020    HCT 42.9 10/13/2020     10/13/2020    CHOL 209 (H) 10/13/2020    TRIG 348 (H) 10/13/2020    HDL 39 (L) 10/13/2020    ALT 18 10/13/2020    AST 25 10/13/2020     10/13/2020    K 4.9 10/13/2020     10/13/2020    CREATININE 1.0 10/13/2020    BUN 8 10/13/2020    CO2 28 10/13/2020    TSH 1.968 10/17/2012    PSA 0.75 10/13/2020       Imaging reviewed today:  none    Endoscopy reviewed today:  none    Anorectal Exam:    Anal Skin: r Posterior hemorrhoidal tag, no fissures, no  tenderness    Digital Rectal Exam:  Resting Tone normal  Mass none  Tenderness  absent    Anoscopy:  Verbal consent was obtained.   Clear plastic anoscope inserted.    Hemorrhoids  present, large  Stigmata of bleeding  present  Stigmata of prolapsed  present  Distal rectal mucosa unable to evaluate due to large hemorrhoids    I did get pt to sit on toilet to elicit his rectal prolapse. Rectal prolapse NOT appreciated but he did have mild R posterior hemorrhoidal prolapse  ASSESSMENT/PLAN:     Darrel was seen today for hemorrhoids.    Diagnoses and all orders for this visit:    Hemorrhoids, unspecified hemorrhoid type  -     Ambulatory referral/consult to Colorectal Surgery    Rectal prolapse  -     Ambulatory referral/consult to Colorectal Surgery    53 y.o. M here today for rectal prolapse, however it appears that he has very large internal hemorrhoids and he is having prolapsing internal hemorrhoids. There is no pain associated with this and very scant bleeding. So I would like to treat conservatively first. Due for first colonoscopy, I scheduled with CRS as  MD may be able to RBL during c scope if conservative  therapy doesn't work    The patient was instructed to:  Ansuol bid x 2 weeks to hemorrhoids  Increase water intake to at least 8-10 glasses of water per day.  Take a daily fiber supplement (Konsyl, Benefiber, Metamucil) and increase dietary intake to 20-30 grams/day.  Avoid straining or spending >5min/event with bowel movements.    Follow-up in clinic PRN    IDALIA Fregoso  Colon and Rectal Surgery

## 2023-04-19 NOTE — TELEPHONE ENCOUNTER
" colonoscopy  Received: Today  Amparo Schneider NP  [facility] Patients  Procedure: Colonoscopy     Diagnosis: Screening colonoscopy     Procedure Timin-4 weeks, he is over due for screening and having BRBPR     *If within 4 weeks selected, please michael as high priority*     *If greater than 12 weeks, please select "5-12 weeks" and delay sending until 2 months prior to requested date*     Provider: Any CRS provider, please document that pt may need RBL during procedure     Location: No Preference     Additional Scheduling Information: No scheduling concerns     Prep Specifications:Standard prep     Have you attached a patient to this message: yes       pt will call back. In  the middle of scheduling had to run an errand  "

## 2023-05-09 ENCOUNTER — TELEPHONE (OUTPATIENT)
Dept: SURGERY | Facility: CLINIC | Age: 53
End: 2023-05-09
Payer: COMMERCIAL

## 2023-05-09 ENCOUNTER — ANESTHESIA EVENT (OUTPATIENT)
Dept: ENDOSCOPY | Facility: HOSPITAL | Age: 53
End: 2023-05-09
Payer: COMMERCIAL

## 2023-05-09 ENCOUNTER — PATIENT MESSAGE (OUTPATIENT)
Dept: SURGERY | Facility: CLINIC | Age: 53
End: 2023-05-09

## 2023-05-09 ENCOUNTER — ANESTHESIA (OUTPATIENT)
Dept: ENDOSCOPY | Facility: HOSPITAL | Age: 53
End: 2023-05-09
Payer: COMMERCIAL

## 2023-05-09 ENCOUNTER — HOSPITAL ENCOUNTER (OUTPATIENT)
Facility: HOSPITAL | Age: 53
Discharge: HOME | End: 2023-05-09
Attending: COLON & RECTAL SURGERY | Admitting: COLON & RECTAL SURGERY
Payer: COMMERCIAL

## 2023-05-09 VITALS
RESPIRATION RATE: 18 BRPM | OXYGEN SATURATION: 97 % | TEMPERATURE: 98 F | HEART RATE: 57 BPM | HEIGHT: 74 IN | WEIGHT: 185 LBS | SYSTOLIC BLOOD PRESSURE: 125 MMHG | BODY MASS INDEX: 23.74 KG/M2 | DIASTOLIC BLOOD PRESSURE: 89 MMHG

## 2023-05-09 DIAGNOSIS — K62.5 RECTAL BLEEDING: Primary | ICD-10-CM

## 2023-05-09 PROCEDURE — 25000003 PHARM REV CODE 250: Performed by: NURSE ANESTHETIST, CERTIFIED REGISTERED

## 2023-05-09 PROCEDURE — 88305 TISSUE EXAM BY PATHOLOGIST: CPT | Mod: 26,,, | Performed by: PATHOLOGY

## 2023-05-09 PROCEDURE — E9220 PRA ENDO ANESTHESIA: HCPCS | Mod: ,,, | Performed by: NURSE ANESTHETIST, CERTIFIED REGISTERED

## 2023-05-09 PROCEDURE — E9220 PRA ENDO ANESTHESIA: ICD-10-PCS | Mod: ,,, | Performed by: NURSE ANESTHETIST, CERTIFIED REGISTERED

## 2023-05-09 PROCEDURE — 37000008 HC ANESTHESIA 1ST 15 MINUTES: Performed by: COLON & RECTAL SURGERY

## 2023-05-09 PROCEDURE — 37000009 HC ANESTHESIA EA ADD 15 MINS: Performed by: COLON & RECTAL SURGERY

## 2023-05-09 PROCEDURE — 45380 COLONOSCOPY AND BIOPSY: CPT | Mod: ,,, | Performed by: COLON & RECTAL SURGERY

## 2023-05-09 PROCEDURE — 25000003 PHARM REV CODE 250: Performed by: COLON & RECTAL SURGERY

## 2023-05-09 PROCEDURE — 45380 COLONOSCOPY AND BIOPSY: CPT | Performed by: COLON & RECTAL SURGERY

## 2023-05-09 PROCEDURE — 88305 TISSUE EXAM BY PATHOLOGIST: CPT | Performed by: PATHOLOGY

## 2023-05-09 PROCEDURE — 88305 TISSUE EXAM BY PATHOLOGIST: ICD-10-PCS | Mod: 26,,, | Performed by: PATHOLOGY

## 2023-05-09 PROCEDURE — 45380 PR COLONOSCOPY,BIOPSY: ICD-10-PCS | Mod: ,,, | Performed by: COLON & RECTAL SURGERY

## 2023-05-09 PROCEDURE — 27201012 HC FORCEPS, HOT/COLD, DISP: Performed by: COLON & RECTAL SURGERY

## 2023-05-09 PROCEDURE — 63600175 PHARM REV CODE 636 W HCPCS: Performed by: NURSE ANESTHETIST, CERTIFIED REGISTERED

## 2023-05-09 RX ORDER — SODIUM CHLORIDE 9 MG/ML
INJECTION, SOLUTION INTRAVENOUS CONTINUOUS
Status: DISCONTINUED | OUTPATIENT
Start: 2023-05-09 | End: 2023-05-09 | Stop reason: HOSPADM

## 2023-05-09 RX ORDER — PROPOFOL 10 MG/ML
VIAL (ML) INTRAVENOUS CONTINUOUS PRN
Status: DISCONTINUED | OUTPATIENT
Start: 2023-05-09 | End: 2023-05-09

## 2023-05-09 RX ORDER — PROPOFOL 10 MG/ML
VIAL (ML) INTRAVENOUS
Status: DISCONTINUED | OUTPATIENT
Start: 2023-05-09 | End: 2023-05-09

## 2023-05-09 RX ORDER — SODIUM CHLORIDE 9 MG/ML
INJECTION, SOLUTION INTRAVENOUS CONTINUOUS PRN
Status: DISCONTINUED | OUTPATIENT
Start: 2023-05-09 | End: 2023-05-09

## 2023-05-09 RX ORDER — LIDOCAINE HYDROCHLORIDE 20 MG/ML
INJECTION INTRAVENOUS
Status: DISCONTINUED | OUTPATIENT
Start: 2023-05-09 | End: 2023-05-09

## 2023-05-09 RX ADMIN — LIDOCAINE HYDROCHLORIDE 80 MG: 20 INJECTION INTRAVENOUS at 10:05

## 2023-05-09 RX ADMIN — SODIUM CHLORIDE: 9 INJECTION, SOLUTION INTRAVENOUS at 09:05

## 2023-05-09 RX ADMIN — PROPOFOL 100 MG: 10 INJECTION, EMULSION INTRAVENOUS at 10:05

## 2023-05-09 RX ADMIN — PROPOFOL 175 MCG/KG/MIN: 10 INJECTION, EMULSION INTRAVENOUS at 10:05

## 2023-05-09 NOTE — ANESTHESIA PREPROCEDURE EVALUATION
05/09/2023  Darrel Puri is a 53 y.o., male.  Past Medical History:   Diagnosis Date    Allergy     Other and unspecified hyperlipidemia 10/18/2013   History reviewed. No pertinent surgical history.        Pre-op Assessment    I have reviewed the Patient Summary Reports.       I have reviewed the Medications.     Review of Systems  Anesthesia Hx:  No problems with previous Anesthesia  Denies Family Hx of Anesthesia complications.   Denies Personal Hx of Anesthesia complications.   Hematology/Oncology:  Hematology Normal   Oncology Normal     EENT/Dental:EENT/Dental Normal   Cardiovascular:  Cardiovascular Normal     Pulmonary:  Pulmonary Normal    Renal/:  Renal/ Normal     Hepatic/GI:  Hepatic/GI Normal    Musculoskeletal:  Musculoskeletal Normal    Neurological:  Neurology Normal    Endocrine:  Endocrine Normal    Dermatological:  Skin Normal    Psych:  Psychiatric Normal           Physical Exam  General: Well nourished    Airway:  Mallampati: II / II  Mouth Opening: Normal  Tongue: Normal  Neck ROM: Normal ROM    Dental:  Intact        Anesthesia Plan  Type of Anesthesia, risks & benefits discussed:    Anesthesia Type: Gen Natural Airway  Intra-op Monitoring Plan: Standard ASA Monitors  Induction:  IV  Informed Consent: Informed consent signed with the Patient and all parties understand the risks and agree with anesthesia plan.  All questions answered.   ASA Score: 2  Day of Surgery Review of History & Physical: H&P Update referred to the surgeon/provider.    Ready For Surgery From Anesthesia Perspective.     .

## 2023-05-09 NOTE — PLAN OF CARE
Procedure completed. Pt discharged with spouse. In no acute distress. Discharge instructions given. Verbalizes understanding of post procedure restrictions and instructions.

## 2023-05-09 NOTE — TRANSFER OF CARE
"Anesthesia Transfer of Care Note    Patient: Darrel Puri    Procedure(s) Performed: Procedure(s) (LRB):  COLONOSCOPY (N/A)    Patient location: GI    Anesthesia Type: general    Transport from OR: Transported from OR on room air with adequate spontaneous ventilation    Post pain: adequate analgesia    Post assessment: no apparent anesthetic complications and tolerated procedure well    Post vital signs: stable    Level of consciousness: awake, alert and oriented    Nausea/Vomiting: no nausea/vomiting    Complications: none    Transfer of care protocol was followed      Last vitals:   Visit Vitals  BP (!) 162/93 (BP Location: Left arm, Patient Position: Lying)   Pulse 80   Temp 36.6 °C (97.9 °F) (Temporal)   Resp 17   Ht 6' 2" (1.88 m)   Wt 83.9 kg (185 lb)   SpO2 99%   BMI 23.75 kg/m²     "

## 2023-05-09 NOTE — TELEPHONE ENCOUNTER
Left message to return call regarding appt.  Scheduled for July 10 at 9:20am, [place] location 2nd floor. Will message via [facility].

## 2023-05-09 NOTE — H&P
Procedure : Colonoscopy    Indication(s):  rectal bleeding    Review of patient's allergies indicates:   Allergen Reactions    Amoxicillin     Penicillins        Past Medical History:   Diagnosis Date    Allergy     Other and unspecified hyperlipidemia 10/18/2013       Prior to Admission medications    Medication Sig Start Date End Date Taking? Authorizing Provider   hydrocortisone (ANUSOL-HC) 2.5 % rectal cream Place rectally 2 (two) times daily. 4/19/23   Amparo Schneider NP       Sedation Problems: NO    Family History   Problem Relation Age of Onset    Stroke Father     Heart disease Father     Diabetes Father     Hyperlipidemia Father     Hypertension Brother     Heart disease Maternal Grandfather     Skin cancer Maternal Grandfather        Fam Hx of Sedation Problems: NO    Social History     Socioeconomic History    Marital status:    Occupational History    Occupation:     Tobacco Use    Smoking status: Never   Substance and Sexual Activity    Alcohol use: Yes     Comment: 7 drinks per week    Drug use: No    Sexual activity: Yes     Partners: Female     Social Determinants of Health     Financial Resource Strain: Low Risk     Difficulty of Paying Living Expenses: Not hard at all   Food Insecurity: No Food Insecurity    Worried About Running Out of Food in the Last Year: Never true    Ran Out of Food in the Last Year: Never true   Transportation Needs: No Transportation Needs    Lack of Transportation (Medical): No    Lack of Transportation (Non-Medical): No   Physical Activity: Sufficiently Active    Days of Exercise per Week: 5 days    Minutes of Exercise per Session: 30 min   Stress: No Stress Concern Present    Feeling of Stress : Only a little   Social Connections: Unknown    Frequency of Communication with Friends and Family: Once a week    Frequency of Social Gatherings with Friends and Family: Once a week    Active Member of Clubs or Organizations: No    Attends Club or  Organization Meetings: Never    Marital Status:    Housing Stability: Low Risk     Unable to Pay for Housing in the Last Year: No    Number of Places Lived in the Last Year: 1    Unstable Housing in the Last Year: No       Review of Systems -     Respiratory ROS: no cough, shortness of breath, or wheezing  Cardiovascular ROS: no chest pain or dyspnea on exertion  Gastrointestinal ROS: positive for - blood in stools  Musculoskeletal ROS: negative  Neurological ROS: no TIA or stroke symptoms        Physical Exam:  General: no distress  Head: normocephalic  Airway:  normal oropharynx, airway normal  Neck: supple, symmetrical, trachea midline  Lungs:  normal respiratory effort  Heart: regular rate and rhythm  Abdomen: soft, non-tender non-distented; bowel sounds normal; no masses,  no organomegaly  Extremities: no cyanosis or edema, or clubbing       Deep Sedation: Mallampati Score per anesthesia     SedationPlan :Moderate     ASA : II    Patient is medically cleared for anesthesia.    Anesthesia/Surgery risks, benefits and alternative options discussed and understood by patient/family.

## 2023-05-09 NOTE — [employer] PATIENT INSTRUCTIONS
Discharge Summary/Instructions after an Endoscopic Procedure  Patient Name: Darrel Puri  Patient MRN: [id number]  Patient YOB: 1970     Tuesday, May 9, 2023  Greyson Thomas MD  Dear patient,  As a result of recent federal legislation (The Federal Cures Act), you may   receive lab or pathology results from your procedure in your [facility]   account before your physician is able to contact you. Your physician or   their representative will relay the results to you with their   recommendations at their soonest availability.  Thank you,  RESTRICTIONS:  During your procedure today, you received medications for sedation.  These   medications may affect your judgment, balance and coordination.  Therefore,   for 24 hours, you have the following restrictions:   - DO NOT drive a car, operate machinery, make legal/financial decisions,   sign important papers or drink alcohol.    ACTIVITY:  Today: no heavy lifting, straining or running due to procedural   sedation/anesthesia.  The following day: return to full activity including work.  DIET:  Eat and drink normally unless instructed otherwise.     TREATMENT FOR COMMON SIDE EFFECTS:  - Mild abdominal pain, nausea, belching, bloating or excessive gas:  rest,   eat lightly and use a heating pad.  - Sore Throat: treat with throat lozenges and/or gargle with warm salt   water.  - Because air was used during the procedure, expelling large amounts of air   from your rectum or belching is normal.  - If a bowel prep was taken, you may not have a bowel movement for 1-3 days.    This is normal.  SYMPTOMS TO WATCH FOR AND REPORT TO YOUR PHYSICIAN:  1. Abdominal pain or bloating, other than gas cramps.  2. Chest pain.  3. Back pain.  4. Signs of infection such as: chills or fever occurring within 24 hours   after the procedure.  5. Rectal bleeding, which would show as bright red, maroon, or black stools.   (A tablespoon of blood from the rectum is not serious, especially if    hemorrhoids are present.)  6. Vomiting.  7. Weakness or dizziness.  GO DIRECTLY TO THE NEAREST EMERGENCY ROOM IF YOU HAVE ANY OF THE FOLLOWING:      Difficulty breathing              Chills and/or fever over 101 F   Persistent vomiting and/or vomiting blood   Severe abdominal pain   Severe chest pain   Black, tarry stools   Bleeding- more than one tablespoon   Any other symptom or condition that you feel may need urgent attention  Your doctor recommends these additional instructions:  If any biopsies were taken, your doctors clinic will contact you in 1 to 2   weeks with any results.  - Discharge patient to home.   - High fiber diet.   - Continue present medications.   - Await pathology results.   - Repeat colonoscopy date to be determined after pending pathology results   are reviewed for surveillance based on pathology results.   - Patient has a contact number available for emergencies.  The signs and   symptoms of potential delayed complications were discussed with the   patient.  Return to normal activities tomorrow.  Written discharge   instructions were provided to the patient.   -Follow-up with me for hemorrhoid banding in office.  For questions, problems or results please call your physician - Greyson Thomas MD at Work:  [phone].  [facility] [place], EMERGENCY ROOM PHONE NUMBER: [phone]  IF A COMPLICATION OR EMERGENCY SITUATION ARISES AND YOU ARE UNABLE TO REACH   YOUR PHYSICIAN - GO DIRECTLY TO THE EMERGENCY ROOM.  Greyson Thomas MD  5/9/2023 10:36:31 AM  This report has been verified and signed electronically.  Dear patient,  As a result of recent federal legislation (The Federal Cures Act), you may   receive lab or pathology results from your procedure in your [facility]   account before your physician is able to contact you. Your physician or   their representative will relay the results to you with their   recommendations at their soonest availability.  Thank you,  [employer]ATION

## 2023-05-09 NOTE — ANESTHESIA POSTPROCEDURE EVALUATION
Anesthesia Post Evaluation    Patient: Darrel Puri    Procedure(s) Performed: Procedure(s) (LRB):  COLONOSCOPY (N/A)    Final Anesthesia Type: general      Patient location during evaluation: GI PACU  Patient participation: Yes- Able to Participate  Level of consciousness: awake and alert, awake and oriented  Post-procedure vital signs: reviewed and stable  Pain management: adequate  Airway patency: patent    PONV status at discharge: No PONV  Anesthetic complications: no      Cardiovascular status: blood pressure returned to baseline, stable and hemodynamically stable  Respiratory status: unassisted, spontaneous ventilation and room air  Hydration status: euvolemic  Follow-up not needed.          Vitals Value Taken Time   /89 05/09/23 1108   Temp 36.7 °C (98.1 °F) 05/09/23 1038   Pulse 57 05/09/23 1108   Resp 18 05/09/23 1108   SpO2 97 % 05/09/23 1108         Event Time   Out of Recovery 11:09:42         Pain/Alpa Score: Alpa Score: 10 (5/9/2023 11:08 AM)

## 2023-05-18 LAB
FINAL PATHOLOGIC DIAGNOSIS: NORMAL
GROSS: NORMAL
Lab: NORMAL
MICROSCOPIC EXAM: NORMAL

## 2024-01-05 ENCOUNTER — TELEPHONE (OUTPATIENT)
Dept: SURGERY | Facility: CLINIC | Age: 54
End: 2024-01-05
Payer: COMMERCIAL

## 2024-01-08 ENCOUNTER — OFFICE VISIT (OUTPATIENT)
Dept: SURGERY | Facility: CLINIC | Age: 54
End: 2024-01-08
Payer: COMMERCIAL

## 2024-01-08 VITALS
BODY MASS INDEX: 24.62 KG/M2 | WEIGHT: 191.81 LBS | HEIGHT: 74 IN | HEART RATE: 72 BPM | SYSTOLIC BLOOD PRESSURE: 138 MMHG | DIASTOLIC BLOOD PRESSURE: 91 MMHG

## 2024-01-08 DIAGNOSIS — K64.8 PROLAPSED INTERNAL HEMORRHOIDS: Primary | ICD-10-CM

## 2024-01-08 PROCEDURE — 1160F RVW MEDS BY RX/DR IN RCRD: CPT | Mod: CPTII,S$GLB,, | Performed by: COLON & RECTAL SURGERY

## 2024-01-08 PROCEDURE — 3008F BODY MASS INDEX DOCD: CPT | Mod: CPTII,S$GLB,, | Performed by: COLON & RECTAL SURGERY

## 2024-01-08 PROCEDURE — 46221 LIGATION OF HEMORRHOID(S): CPT | Mod: S$GLB,,, | Performed by: COLON & RECTAL SURGERY

## 2024-01-08 PROCEDURE — 99999 PR PBB SHADOW E&M-EST. PATIENT-LVL III: CPT | Mod: PBBFAC,,, | Performed by: COLON & RECTAL SURGERY

## 2024-01-08 PROCEDURE — 3075F SYST BP GE 130 - 139MM HG: CPT | Mod: CPTII,S$GLB,, | Performed by: COLON & RECTAL SURGERY

## 2024-01-08 PROCEDURE — 1159F MED LIST DOCD IN RCRD: CPT | Mod: CPTII,S$GLB,, | Performed by: COLON & RECTAL SURGERY

## 2024-01-08 PROCEDURE — 99212 OFFICE O/P EST SF 10 MIN: CPT | Mod: 25,S$GLB,, | Performed by: COLON & RECTAL SURGERY

## 2024-01-08 PROCEDURE — 3080F DIAST BP >= 90 MM HG: CPT | Mod: CPTII,S$GLB,, | Performed by: COLON & RECTAL SURGERY

## 2024-01-08 NOTE — PROGRESS NOTES
Subjective:       Patient ID: Darrel Puri is a 53 y.o. male.    Chief Complaint: Follow-up and Hemorrhoids    HPI 52 yo M seen April 2023 by CRS NP for prolapsing hemorrhoids.  Treated with fiber/anusol.    I performed colonoscopy 5/9/23 - noted to have grade 2 internal hemorrhoids, 3 mm polypoid lesion was removed from desc colon - path showed normal mucosa.    He returns today for follow-up. Still has intermittent prolapse, easily reducible.  No significant bleeding.  No problems with constipation/straining, spends about 10 min on toilet with each BM.    No family hx of CRC or IBD.      Review of patient's allergies indicates:   Allergen Reactions    Amoxicillin     Penicillins        Past Medical History:   Diagnosis Date    Allergy     Other and unspecified hyperlipidemia 10/18/2013       Past Surgical History:   Procedure Laterality Date    COLONOSCOPY N/A 5/9/2023    Procedure: COLONOSCOPY;  Surgeon: Greyson Thomas MD;  Location: [facility] ([address] [place] [address]);  Service: Colon and Rectal;  Laterality: N/A;  may need rbl during procedure-any crs-suprep-instr portal-tb  precall confirmed 5/3 EB       Current Outpatient Medications   Medication Sig Dispense Refill    hydrocortisone (ANUSOL-HC) 2.5 % rectal cream Place rectally 2 (two) times daily. (Patient not taking: Reported on 1/8/2024) 28 g 1     No current facility-administered medications for this visit.       Family History   Problem Relation Age of Onset    Stroke Father     Heart disease Father     Diabetes Father     Hyperlipidemia Father     Hypertension Brother     Heart disease Maternal Grandfather     Skin cancer Maternal Grandfather        Social History     Socioeconomic History    Marital status:    Occupational History    Occupation:     Tobacco Use    Smoking status: Never   Substance and Sexual Activity    Alcohol use: Yes     Comment: 7 drinks per week    Drug use: No    Sexual activity: Yes     Partners: Female     Social Determinants  of Health     Financial Resource Strain: Low Risk  (1/7/2024)    Overall Financial Resource Strain (CARDIA)     Difficulty of Paying Living Expenses: Not hard at all   Food Insecurity: No Food Insecurity (1/7/2024)    Hunger Vital Sign     Worried About Running Out of Food in the Last Year: Never true     Ran Out of Food in the Last Year: Never true   Transportation Needs: No Transportation Needs (1/7/2024)    PRAPARE - Transportation     Lack of Transportation (Medical): No     Lack of Transportation (Non-Medical): No   Physical Activity: Sufficiently Active (1/7/2024)    Exercise Vital Sign     Days of Exercise per Week: 5 days     Minutes of Exercise per Session: 30 min   Stress: No Stress Concern Present (1/7/2024)    [place] [employer] - Occupational Stress Questionnaire     Feeling of Stress : Not at all   Social Connections: Unknown (1/7/2024)    Social Connection and Isolation Panel [NHANES]     Frequency of Communication with Friends and Family: Once a week     Frequency of Social Gatherings with Friends and Family: Once a week     Active Member of Clubs or Organizations: No     Attends Club or Organization Meetings: Never     Marital Status:    Housing Stability: Low Risk  (1/7/2024)    Housing Stability Vital Sign     Unable to Pay for Housing in the Last Year: No     Number of Places Lived in the Last Year: 1     Unstable Housing in the Last Year: No       Review of Systems   Gastrointestinal:         +hemorrhoidal prolapse       Objective:      Physical Exam  Vitals reviewed. Exam conducted with a chaperone present.   Constitutional:       Appearance: He is well-developed.   HENT:      Head: Normocephalic and atraumatic.   Eyes:      Conjunctiva/sclera: Conjunctivae normal.   Pulmonary:      Effort: Pulmonary effort is normal. No respiratory distress.   Abdominal:      General: There is no distension.      Palpations: Abdomen is soft. There is no mass.      Tenderness: There  is no abdominal tenderness. There is no guarding or rebound.   Genitourinary:     Comments: Perineum - normal perianal skin, no mass, no fissure, no external hemorrhoids  PATI - good tone, no mass  Anoscopy - Grade 2 internal hemorrhoid LL with moderate inflammation    Skin:     General: Skin is warm and dry.      Findings: No erythema.   Neurological:      Mental Status: He is alert and oriented to person, place, and time.           Lab Results   Component Value Date    WBC 2.47 (L) 10/13/2020    HGB 14.1 10/13/2020    HCT 42.9 10/13/2020    MCV 99 (H) 10/13/2020     10/13/2020     BMP  Lab Results   Component Value Date     10/13/2020    K 4.9 10/13/2020     10/13/2020    CO2 28 10/13/2020    BUN 8 10/13/2020    CREATININE 1.0 10/13/2020    CALCIUM 9.5 10/13/2020    ANIONGAP 6 (L) 10/13/2020    ESTGFRAFRICA >60.0 10/13/2020    EGFRNONAA >60.0 10/13/2020     CMP  Sodium   Date Value Ref Range Status   10/13/2020 142 136 - 145 mmol/L Final     Potassium   Date Value Ref Range Status   10/13/2020 4.9 3.5 - 5.1 mmol/L Final     Chloride   Date Value Ref Range Status   10/13/2020 108 95 - 110 mmol/L Final     CO2   Date Value Ref Range Status   10/13/2020 28 23 - 29 mmol/L Final     Glucose   Date Value Ref Range Status   10/13/2020 85 70 - 110 mg/dL Final     BUN   Date Value Ref Range Status   10/13/2020 8 6 - 20 mg/dL Final     Creatinine   Date Value Ref Range Status   10/13/2020 1.0 0.5 - 1.4 mg/dL Final     Calcium   Date Value Ref Range Status   10/13/2020 9.5 8.7 - 10.5 mg/dL Final     Total Protein   Date Value Ref Range Status   10/13/2020 7.4 6.0 - 8.4 g/dL Final     Albumin   Date Value Ref Range Status   10/13/2020 4.3 3.5 - 5.2 g/dL Final     Total Bilirubin   Date Value Ref Range Status   10/13/2020 0.4 0.1 - 1.0 mg/dL Final     Comment:     For infants and newborns, interpretation of results should be based  on gestational age, weight and in agreement with  "clinical  observations.  Premature Infant recommended reference ranges:  Up to 24 hours.............<8.0 mg/dL  Up to 48 hours............<12.0 mg/dL  3-5 days..................<15.0 mg/dL  6-29 days.................<15.0 mg/dL       Alkaline Phosphatase   Date Value Ref Range Status   10/13/2020 73 55 - 135 U/L Final     AST   Date Value Ref Range Status   10/13/2020 25 10 - 40 U/L Final     ALT   Date Value Ref Range Status   10/13/2020 18 10 - 44 U/L Final     Anion Gap   Date Value Ref Range Status   10/13/2020 6 (L) 8 - 16 mmol/L Final     eGFR if    Date Value Ref Range Status   10/13/2020 >60.0 >60 mL/min/1.73 m^2 Final     eGFR if non    Date Value Ref Range Status   10/13/2020 >60.0 >60 mL/min/1.73 m^2 Final     Comment:     Calculation used to obtain the estimated glomerular filtration  rate (eGFR) is the CKD-EPI equation.        No results found for: "CEA"        Assessment:       1. Prolapsed internal hemorrhoids        Plan:   PROCEDURE:  Anoscopy - Diagnostic - Internal Hemorrhoids   stGstrstastdstest:st st1st With informed consent 1 rubber band was applied at left lateral position.  The procedure was tolerated well.  The patient was given a handout which discussed their disease process, precautions, and instructions for follow-up and therapy.    Recommend increased fiber/fluid intake.  Avoid straining/constipation - use Colace and/or MiraLax as needed.  Avoid prolonged sitting on the toilet    RTO prn    Screening colonoscopy 10 years from last.      Greyson Thomas MD, FACS, FASCRS  , Department of Colon & Rectal Surgery     This note was created using voice recognition software, and may contain some unrecognized transcriptional errors.     "

## 2024-01-08 NOTE — LETTER
January 8, 2024      Eris Higgins MD  [address]  [place] [address]           [facility]  [address] [facility] [address]  Phone: [phone]  Fax: [phone]          Patient: Darrel Puri   MR Number: [id number]   YOB: 1970   Date of Visit: 1/8/2024       Dear Dr Higgins:    Thank you for referring Darrel Puri to me for evaluation. Attached you will find relevant portions of my assessment and plan of care.    If you have questions, please do not hesitate to call me. I look forward to following Darrel Puri along with you.    Sincerely,    Greyson Thomas MD    Enclosure  CC:  No Recipients    If you would like to receive this communication electronically, please contact [email or web address] or [phone] to request more information on [employer] Link access.    For providers and/or their staff who would like to refer a patient to [facility], please contact us through our one-stop-shop provider referral line, [facility], at [phone].    If you feel you have received this communication in error or would no longer like to receive these types of communications, please e-mail [email or web address]

## 2024-02-26 ENCOUNTER — IMMUNIZATION (OUTPATIENT)
Dept: INTERNAL MEDICINE | Facility: CLINIC | Age: 54
End: 2024-02-26
Payer: COMMERCIAL

## 2024-02-26 DIAGNOSIS — Z23 NEED FOR VACCINATION: Primary | ICD-10-CM

## 2024-02-26 PROCEDURE — 91322 SARSCOV2 VAC 50 MCG/0.5ML IM: CPT | Mod: S$GLB,,, | Performed by: INTERNAL MEDICINE

## 2024-02-26 PROCEDURE — 90480 ADMN SARSCOV2 VAC 1/ONLY CMP: CPT | Mod: S$GLB,,, | Performed by: INTERNAL MEDICINE

## 2024-06-10 ENCOUNTER — PATIENT MESSAGE (OUTPATIENT)
Dept: INTERNAL MEDICINE | Facility: CLINIC | Age: 54
End: 2024-06-10
Payer: COMMERCIAL

## 2025-01-07 ENCOUNTER — IMMUNIZATION (OUTPATIENT)
Dept: INTERNAL MEDICINE | Facility: CLINIC | Age: 55
End: 2025-01-07
Payer: COMMERCIAL

## 2025-01-07 ENCOUNTER — OFFICE VISIT (OUTPATIENT)
Dept: INTERNAL MEDICINE | Facility: CLINIC | Age: 55
End: 2025-01-07
Payer: COMMERCIAL

## 2025-01-07 VITALS
HEIGHT: 74 IN | DIASTOLIC BLOOD PRESSURE: 80 MMHG | WEIGHT: 193.31 LBS | SYSTOLIC BLOOD PRESSURE: 118 MMHG | OXYGEN SATURATION: 98 % | BODY MASS INDEX: 24.81 KG/M2 | HEART RATE: 75 BPM

## 2025-01-07 DIAGNOSIS — Z23 NEED FOR VACCINATION: Primary | ICD-10-CM

## 2025-01-07 DIAGNOSIS — Z13.1 SCREENING FOR DIABETES MELLITUS: ICD-10-CM

## 2025-01-07 DIAGNOSIS — E78.49 OTHER HYPERLIPIDEMIA: ICD-10-CM

## 2025-01-07 DIAGNOSIS — Z00.00 ENCOUNTER FOR ANNUAL PHYSICAL EXAM: Primary | ICD-10-CM

## 2025-01-07 DIAGNOSIS — Z00.00 LABORATORY EXAMINATION ORDERED AS PART OF A ROUTINE GENERAL MEDICAL EXAMINATION: ICD-10-CM

## 2025-01-07 DIAGNOSIS — Z12.5 SCREENING FOR PROSTATE CANCER: ICD-10-CM

## 2025-01-07 PROCEDURE — 99999 PR PBB SHADOW E&M-EST. PATIENT-LVL IV: CPT | Mod: PBBFAC,,, | Performed by: INTERNAL MEDICINE

## 2025-01-07 PROCEDURE — 91320 SARSCV2 VAC 30MCG TRS-SUC IM: CPT | Mod: S$GLB,,, | Performed by: INTERNAL MEDICINE

## 2025-01-07 PROCEDURE — 99396 PREV VISIT EST AGE 40-64: CPT | Mod: S$GLB,,, | Performed by: INTERNAL MEDICINE

## 2025-01-07 PROCEDURE — 90471 IMMUNIZATION ADMIN: CPT | Mod: S$GLB,,, | Performed by: INTERNAL MEDICINE

## 2025-01-07 PROCEDURE — 90656 IIV3 VACC NO PRSV 0.5 ML IM: CPT | Mod: S$GLB,,, | Performed by: INTERNAL MEDICINE

## 2025-01-07 PROCEDURE — 3079F DIAST BP 80-89 MM HG: CPT | Mod: CPTII,S$GLB,, | Performed by: INTERNAL MEDICINE

## 2025-01-07 PROCEDURE — 1160F RVW MEDS BY RX/DR IN RCRD: CPT | Mod: CPTII,S$GLB,, | Performed by: INTERNAL MEDICINE

## 2025-01-07 PROCEDURE — 3074F SYST BP LT 130 MM HG: CPT | Mod: CPTII,S$GLB,, | Performed by: INTERNAL MEDICINE

## 2025-01-07 PROCEDURE — 3008F BODY MASS INDEX DOCD: CPT | Mod: CPTII,S$GLB,, | Performed by: INTERNAL MEDICINE

## 2025-01-07 PROCEDURE — 90480 ADMN SARSCOV2 VAC 1/ONLY CMP: CPT | Mod: S$GLB,,, | Performed by: INTERNAL MEDICINE

## 2025-01-07 PROCEDURE — 1159F MED LIST DOCD IN RCRD: CPT | Mod: CPTII,S$GLB,, | Performed by: INTERNAL MEDICINE

## 2025-01-07 NOTE — PROGRESS NOTES
"Subjective:       Patient ID: Darrel Puri is a 54 y.o. male.    Chief Complaint: Annual Exam      HPI  Darrel Puri is a 54 y.o. year old male established patient of Dr. Higgins with hyperlipidemia presents for annual exam. At baseline health. Due for immunizations.     Review of Systems   Constitutional:  Negative for activity change, appetite change, fatigue and unexpected weight change.   HENT:  Negative for hearing loss, rhinorrhea and trouble swallowing.    Eyes:  Negative for discharge and visual disturbance.   Respiratory:  Negative for chest tightness and wheezing.    Cardiovascular:  Negative for chest pain and palpitations.   Gastrointestinal:  Negative for blood in stool, constipation, diarrhea and vomiting.   Endocrine: Negative for polydipsia and polyuria.   Genitourinary:  Negative for difficulty urinating, hematuria and urgency.   Musculoskeletal:  Negative for arthralgias, back pain, joint swelling and neck pain.   Neurological:  Negative for weakness and headaches.   Psychiatric/Behavioral:  Negative for confusion, dysphoric mood and sleep disturbance. The patient is not nervous/anxious.          Past Medical History:   Diagnosis Date    Allergy     Other and unspecified hyperlipidemia 10/18/2013        Prior to Admission medications    Medication Sig Start Date End Date Taking? Authorizing Provider   hydrocortisone (ANUSOL-HC) 2.5 % rectal cream Place rectally 2 (two) times daily.  Patient not taking: Reported on 1/8/2024 4/19/23   Amparo Schneider NP        Past medical history, surgical history, and family medical history reviewed and updated as appropriate.    Medications and allergies reviewed.     Objective:          Vitals:    01/07/25 1341   BP: 118/80   BP Location: Right arm   Patient Position: Sitting   Pulse: 75   SpO2: 98%   Weight: 87.7 kg (193 lb 5.5 oz)   Height: 6' 2" (1.88 m)     Body mass index is 24.82 kg/m².  Physical Exam  Constitutional:       General: He is not in acute " distress.     Appearance: He is well-developed.   HENT:      Head: Normocephalic and atraumatic.      Nose: Nose normal.   Eyes:      General: No scleral icterus.     Extraocular Movements: Extraocular movements intact.      Conjunctiva/sclera: Conjunctivae normal.   Neck:      Thyroid: No thyromegaly.      Vascular: No JVD.      Trachea: No tracheal deviation.   Cardiovascular:      Rate and Rhythm: Normal rate and regular rhythm.      Heart sounds: Normal heart sounds. No murmur heard.     No friction rub. No gallop.   Pulmonary:      Effort: Pulmonary effort is normal. No respiratory distress.      Breath sounds: Normal breath sounds. No wheezing or rales.   Abdominal:      General: Bowel sounds are normal. There is no distension.      Palpations: Abdomen is soft. There is no mass.      Tenderness: There is no abdominal tenderness.   Musculoskeletal:         General: No tenderness. Normal range of motion.      Cervical back: Normal range of motion and neck supple.   Lymphadenopathy:      Cervical: No cervical adenopathy.   Skin:     General: Skin is warm and dry.      Findings: No rash.   Neurological:      Mental Status: He is alert and oriented to person, place, and time.      Cranial Nerves: No cranial nerve deficit.      Deep Tendon Reflexes: Reflexes normal.   Psychiatric:         Behavior: Behavior normal.         Lab Results   Component Value Date    WBC 2.47 (L) 10/13/2020    HGB 14.1 10/13/2020    HCT 42.9 10/13/2020     10/13/2020    CHOL 205 (H) 04/19/2023    TRIG 116 04/19/2023    HDL 43 04/19/2023    ALT 18 10/13/2020    AST 25 10/13/2020     10/13/2020    K 4.9 10/13/2020     10/13/2020    CREATININE 1.0 10/13/2020    BUN 8 10/13/2020    CO2 28 10/13/2020    TSH 1.611 04/19/2023    PSA 0.94 04/19/2023       Assessment:       1. Encounter for annual physical exam    2. Screening for prostate cancer    3. Other hyperlipidemia    4. Laboratory examination ordered as part of a routine  general medical examination    5. Screening for diabetes mellitus          Plan:     Darrel was seen today for annual exam.    Diagnoses and all orders for this visit:    Encounter for annual physical exam    Screening for prostate cancer  -     PSA, SCREENING; Future    Other hyperlipidemia  Comments:  repeat lipid panel, ascvd score <5% currently based on previous labs, patient willing to start statin if necessary  Orders:  -     TSH; Future  -     Lipid Panel; Future    Laboratory examination ordered as part of a routine general medical examination  -     CBC Auto Differential; Future  -     Comprehensive Metabolic Panel; Future    Screening for diabetes mellitus  -     Hemoglobin A1C; Future      The 10-year ASCVD risk score (Annette RAUSCH, et al., 2019) is: 4.8%    Values used to calculate the score:      Age: 54 years      Sex: Male      Is Non- : No      Diabetic: No      Tobacco smoker: No      Systolic Blood Pressure: 118 mmHg      Is BP treated: No      HDL Cholesterol: 43 mg/dL      Total Cholesterol: 205 mg/dL    Health maintenance reviewed with patient.     Follow up in about 1 year (around 1/7/2026).    Stephen Thurman MD  Internal Medicine / Primary Care  [facility]  1/7/2025

## 2025-01-07 NOTE — PATIENT INSTRUCTIONS
Flu and covid-19 vaccinations today  Schedule fasting labwork    Recommended vaccinations - Tdap, Pneumonia, Shingles series    Tdap - tetanus, diptheria and pertussis  Pneumonia - PCV20 or prevnar 20, 20 most common strains of strep pneumo bacteria.   Shingrix - two shot series, 2-6 months apart.     Can get here at [facility] or at any local pharmacy.     Return to clinic in 1 year or sooner if needed.

## 2025-01-08 ENCOUNTER — LAB VISIT (OUTPATIENT)
Dept: LAB | Facility: HOSPITAL | Age: 55
End: 2025-01-08
Attending: INTERNAL MEDICINE
Payer: COMMERCIAL

## 2025-01-08 DIAGNOSIS — Z00.00 LABORATORY EXAMINATION ORDERED AS PART OF A ROUTINE GENERAL MEDICAL EXAMINATION: ICD-10-CM

## 2025-01-08 DIAGNOSIS — Z13.1 SCREENING FOR DIABETES MELLITUS: ICD-10-CM

## 2025-01-08 DIAGNOSIS — E78.49 OTHER HYPERLIPIDEMIA: ICD-10-CM

## 2025-01-08 DIAGNOSIS — Z12.5 SCREENING FOR PROSTATE CANCER: ICD-10-CM

## 2025-01-08 LAB
ALBUMIN SERPL BCP-MCNC: 4.3 G/DL (ref 3.5–5.2)
ALP SERPL-CCNC: 73 U/L (ref 40–150)
ALT SERPL W/O P-5'-P-CCNC: 19 U/L (ref 10–44)
ANION GAP SERPL CALC-SCNC: 9 MMOL/L (ref 8–16)
AST SERPL-CCNC: 20 U/L (ref 10–40)
BASOPHILS # BLD AUTO: 0.02 K/UL (ref 0–0.2)
BASOPHILS NFR BLD: 0.4 % (ref 0–1.9)
BILIRUB SERPL-MCNC: 1 MG/DL (ref 0.1–1)
BUN SERPL-MCNC: 9 MG/DL (ref 6–20)
CALCIUM SERPL-MCNC: 9.3 MG/DL (ref 8.7–10.5)
CHLORIDE SERPL-SCNC: 106 MMOL/L (ref 95–110)
CHOLEST SERPL-MCNC: 240 MG/DL (ref 120–199)
CHOLEST/HDLC SERPL: 5.9 {RATIO} (ref 2–5)
CO2 SERPL-SCNC: 26 MMOL/L (ref 23–29)
COMPLEXED PSA SERPL-MCNC: 0.85 NG/ML (ref 0–4)
CREAT SERPL-MCNC: 1 MG/DL (ref 0.5–1.4)
DIFFERENTIAL METHOD BLD: ABNORMAL
EOSINOPHIL # BLD AUTO: 0 K/UL (ref 0–0.5)
EOSINOPHIL NFR BLD: 0.8 % (ref 0–8)
ERYTHROCYTE [DISTWIDTH] IN BLOOD BY AUTOMATED COUNT: 12 % (ref 11.5–14.5)
EST. GFR  (NO RACE VARIABLE): >60 ML/MIN/1.73 M^2
ESTIMATED AVG GLUCOSE: 94 MG/DL (ref 68–131)
GLUCOSE SERPL-MCNC: 95 MG/DL (ref 70–110)
HBA1C MFR BLD: 4.9 % (ref 4–5.6)
HCT VFR BLD AUTO: 47 % (ref 40–54)
HDLC SERPL-MCNC: 41 MG/DL (ref 40–75)
HDLC SERPL: 17.1 % (ref 20–50)
HGB BLD-MCNC: 15.4 G/DL (ref 14–18)
IMM GRANULOCYTES # BLD AUTO: 0.01 K/UL (ref 0–0.04)
IMM GRANULOCYTES NFR BLD AUTO: 0.2 % (ref 0–0.5)
LDLC SERPL CALC-MCNC: 161 MG/DL (ref 63–159)
LYMPHOCYTES # BLD AUTO: 0.4 K/UL (ref 1–4.8)
LYMPHOCYTES NFR BLD: 7.4 % (ref 18–48)
MCH RBC QN AUTO: 31.8 PG (ref 27–31)
MCHC RBC AUTO-ENTMCNC: 32.8 G/DL (ref 32–36)
MCV RBC AUTO: 97 FL (ref 82–98)
MONOCYTES # BLD AUTO: 0.4 K/UL (ref 0.3–1)
MONOCYTES NFR BLD: 8 % (ref 4–15)
NEUTROPHILS # BLD AUTO: 4.4 K/UL (ref 1.8–7.7)
NEUTROPHILS NFR BLD: 83.2 % (ref 38–73)
NONHDLC SERPL-MCNC: 199 MG/DL
NRBC BLD-RTO: 0 /100 WBC
PLATELET # BLD AUTO: 228 K/UL (ref 150–450)
PMV BLD AUTO: 10.1 FL (ref 9.2–12.9)
POTASSIUM SERPL-SCNC: 4 MMOL/L (ref 3.5–5.1)
PROT SERPL-MCNC: 7.9 G/DL (ref 6–8.4)
RBC # BLD AUTO: 4.84 M/UL (ref 4.6–6.2)
SODIUM SERPL-SCNC: 141 MMOL/L (ref 136–145)
TRIGL SERPL-MCNC: 190 MG/DL (ref 30–150)
TSH SERPL DL<=0.005 MIU/L-ACNC: 2.42 UIU/ML (ref 0.4–4)
WBC # BLD AUTO: 5.24 K/UL (ref 3.9–12.7)

## 2025-01-08 PROCEDURE — 36415 COLL VENOUS BLD VENIPUNCTURE: CPT | Performed by: INTERNAL MEDICINE

## 2025-01-08 PROCEDURE — 84443 ASSAY THYROID STIM HORMONE: CPT | Performed by: INTERNAL MEDICINE

## 2025-01-08 PROCEDURE — 85025 COMPLETE CBC W/AUTO DIFF WBC: CPT | Performed by: INTERNAL MEDICINE

## 2025-01-08 PROCEDURE — 80061 LIPID PANEL: CPT | Performed by: INTERNAL MEDICINE

## 2025-01-08 PROCEDURE — 84153 ASSAY OF PSA TOTAL: CPT | Performed by: INTERNAL MEDICINE

## 2025-01-08 PROCEDURE — 80053 COMPREHEN METABOLIC PANEL: CPT | Performed by: INTERNAL MEDICINE

## 2025-01-08 PROCEDURE — 83036 HEMOGLOBIN GLYCOSYLATED A1C: CPT | Performed by: INTERNAL MEDICINE

## 2025-01-09 DIAGNOSIS — E78.2 MIXED HYPERLIPIDEMIA: Primary | ICD-10-CM

## 2025-07-03 ENCOUNTER — TELEPHONE (OUTPATIENT)
Dept: DERMATOLOGY | Facility: CLINIC | Age: 55
End: 2025-07-03
Payer: COMMERCIAL